# Patient Record
Sex: FEMALE | Race: WHITE | Employment: FULL TIME | URBAN - METROPOLITAN AREA
[De-identification: names, ages, dates, MRNs, and addresses within clinical notes are randomized per-mention and may not be internally consistent; named-entity substitution may affect disease eponyms.]

---

## 2020-04-15 PROBLEM — Z34.90 PRENATAL CARE, ANTEPARTUM: Status: ACTIVE | Noted: 2020-04-15

## 2020-06-08 PROBLEM — O43.199 MARGINAL INSERTION OF UMBILICAL CORD AFFECTING MANAGEMENT OF MOTHER: Status: ACTIVE | Noted: 2020-06-08

## 2020-10-14 PROBLEM — O43.193 PLACENTA SUCCENTURIATA IN THIRD TRIMESTER: Status: ACTIVE | Noted: 2020-10-14

## 2020-10-28 PROBLEM — O36.63X0 MACROSOMIA AFFECTING MANAGEMENT OF MOTHER IN THIRD TRIMESTER: Status: ACTIVE | Noted: 2020-10-28

## 2020-10-28 PROBLEM — R03.0 TRANSIENT HYPERTENSION: Status: ACTIVE | Noted: 2020-10-28

## 2020-10-30 PROBLEM — Z34.90 TERM PREGNANCY: Status: ACTIVE | Noted: 2020-10-30

## 2020-10-30 PROBLEM — O13.3 GESTATIONAL (PREGNANCY-INDUCED) HYPERTENSION WITHOUT SIGNIFICANT PROTEINURIA, THIRD TRIMESTER: Status: ACTIVE | Noted: 2020-10-30

## 2021-05-06 PROBLEM — O43.199 MARGINAL INSERTION OF UMBILICAL CORD AFFECTING MANAGEMENT OF MOTHER: Status: RESOLVED | Noted: 2020-06-08 | Resolved: 2021-05-06

## 2021-05-06 PROBLEM — Z34.90 PRENATAL CARE, ANTEPARTUM: Status: RESOLVED | Noted: 2020-04-15 | Resolved: 2021-05-06

## 2021-05-06 PROBLEM — O36.63X0 MACROSOMIA AFFECTING MANAGEMENT OF MOTHER IN THIRD TRIMESTER: Status: RESOLVED | Noted: 2020-10-28 | Resolved: 2021-05-06

## 2021-05-06 PROBLEM — Z34.90 TERM PREGNANCY: Status: RESOLVED | Noted: 2020-10-30 | Resolved: 2021-05-06

## 2021-05-06 PROBLEM — R03.0 TRANSIENT HYPERTENSION: Status: RESOLVED | Noted: 2020-10-28 | Resolved: 2021-05-06

## 2021-05-06 PROBLEM — O43.193 PLACENTA SUCCENTURIATA IN THIRD TRIMESTER: Status: RESOLVED | Noted: 2020-10-14 | Resolved: 2021-05-06

## 2021-05-06 PROBLEM — O13.3 GESTATIONAL (PREGNANCY-INDUCED) HYPERTENSION WITHOUT SIGNIFICANT PROTEINURIA, THIRD TRIMESTER: Status: RESOLVED | Noted: 2020-10-30 | Resolved: 2021-05-06

## 2022-02-18 ENCOUNTER — HOSPITAL ENCOUNTER (OUTPATIENT)
Age: 27
Discharge: HOME | End: 2022-02-18
Attending: OBSTETRICS & GYNECOLOGY | Admitting: OBSTETRICS & GYNECOLOGY
Payer: COMMERCIAL

## 2022-02-18 VITALS
BODY MASS INDEX: 36.96 KG/M2 | RESPIRATION RATE: 18 BRPM | HEIGHT: 66 IN | WEIGHT: 230 LBS | SYSTOLIC BLOOD PRESSURE: 129 MMHG | TEMPERATURE: 98.6 F | DIASTOLIC BLOOD PRESSURE: 74 MMHG | HEART RATE: 108 BPM
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
## 2022-02-18 LAB
AMPHETAMINE SCREEN, URINE: NOT DETECTED
BARBITURATE SCREEN URINE: NOT DETECTED
BENZODIAZEPINE SCREEN, URINE: NOT DETECTED
CANNABINOID SCREEN URINE: NOT DETECTED
COCAINE METABOLITE SCREEN URINE: NOT DETECTED
FENTANYL SCREEN, URINE: NOT DETECTED
Lab: NORMAL
METHADONE SCREEN, URINE: NOT DETECTED
OPIATE SCREEN URINE: NOT DETECTED
OXYCODONE URINE: NOT DETECTED
PHENCYCLIDINE SCREEN URINE: NOT DETECTED

## 2022-02-18 PROCEDURE — 80307 DRUG TEST PRSMV CHEM ANLYZR: CPT

## 2022-02-18 RX ORDER — ACETAMINOPHEN 325 MG/1
650 TABLET ORAL EVERY 6 HOURS PRN
Status: ON HOLD | COMMUNITY
End: 2022-04-10 | Stop reason: HOSPADM

## 2022-02-18 NOTE — PROGRESS NOTES
Patient received by ambulance with complaint of falling at work on ice and landing on hands and knees. States she has no discomfort at present time. No bruising or discolorations and no scratches noted on either hands or knees. Patient states she felt contractions after fall that stopped after voiding. EDC of 22, 31 weeks and 2 days . Joseph Cash   Placed on EFM

## 2022-02-18 NOTE — PROGRESS NOTES
Dr Marley Vincent called and notified that patient was comfortable and fetal tracing was reactive.   Ok to discharge at this time

## 2022-03-09 ENCOUNTER — HOSPITAL ENCOUNTER (OUTPATIENT)
Age: 27
Discharge: HOME | End: 2022-03-09
Payer: COMMERCIAL

## 2022-03-09 LAB
GLUCOSE TOLERANCE TEST 1 HOUR: 157 MG/DL
GLUCOSE TOLERANCE TEST 2 HOUR: 157 MG/DL
GLUCOSE TOLERANCE TEST FASTING: 91 MG/DL

## 2022-03-09 PROCEDURE — 82951 GLUCOSE TOLERANCE TEST (GTT): CPT

## 2022-03-09 PROCEDURE — 36415 COLL VENOUS BLD VENIPUNCTURE: CPT

## 2022-03-14 ENCOUNTER — APPOINTMENT (OUTPATIENT)
Dept: LABOR AND DELIVERY | Age: 27
End: 2022-03-14
Payer: COMMERCIAL

## 2022-03-14 ENCOUNTER — HOSPITAL ENCOUNTER (OUTPATIENT)
Age: 27
Discharge: HOME | End: 2022-03-14
Attending: OBSTETRICS & GYNECOLOGY | Admitting: OBSTETRICS & GYNECOLOGY
Payer: COMMERCIAL

## 2022-03-14 VITALS
HEART RATE: 114 BPM | TEMPERATURE: 98.6 F | SYSTOLIC BLOOD PRESSURE: 129 MMHG | RESPIRATION RATE: 18 BRPM | DIASTOLIC BLOOD PRESSURE: 67 MMHG

## 2022-03-14 PROCEDURE — 59025 FETAL NON-STRESS TEST: CPT

## 2022-03-15 NOTE — PROGRESS NOTES
Patient arrived ambulatory for NST due to GDM. Patient reports she was sent here for NST this evening after her appointment at Dr. Cisneros  office this afternnoon where she did not have active fetal movement. GA 34w5d, EDC 4-. Patient reports no pain/problems urinating, no leakage of vaginal fluids/vaginal bleeding, and decreased fetal movement. NST explained to patient. VS taken and EFM applied. At this time, after fetal monitors applied, patient does have positive active fetal movement, and fetal movement is audible.

## 2022-03-15 NOTE — PROGRESS NOTES
Telephone update given to Dr. Jamie Rodriguez, NST is reactive, and patient has perceived active fetal movement throughout the NST. Dr. Jamie Rodriguez said patient can go home, and no additional NSTs need to be scheduled at this time.

## 2022-03-16 ENCOUNTER — HOSPITAL ENCOUNTER (OUTPATIENT)
Dept: DIABETES SERVICES | Age: 27
Setting detail: THERAPIES SERIES
Discharge: HOME | End: 2022-03-16
Payer: COMMERCIAL

## 2022-03-16 VITALS — BODY MASS INDEX: 38.57 KG/M2 | WEIGHT: 240 LBS | HEIGHT: 66 IN

## 2022-03-16 PROCEDURE — G0108 DIAB MANAGE TRN  PER INDIV: HCPCS | Performed by: DIETITIAN, REGISTERED

## 2022-03-16 NOTE — PROGRESS NOTES
Diabetes Self-Management Education Record    Participant Name: Page Hammonds  Referring Provider: Nickie Valero DO  Assessment/Evaluation Ratings:  1=Needs Instruction   4=Demonstrates Understanding/Competency  2=Needs Review   NC=Not Covered    3=Comprehends Key Points  N/A=Not Applicable  Topics/Learning Objectives Pre-session Assess Date:  Instructor initials/date  3/16/2022-ALVARADO   Instr. Date    Instructor initials/date  3/16/2022-ALVARADO Follow-up Post- session Eval Comments   Diabetes disease process & Treatment process:   -Define type of diabetes in simple terms.  - Describe the ABCs of  diabetes management  -Identify own type of diabetes  -Identify lifestyle changes/treatment options  -other:  1 [x] All     []  []  []  []  []  3 3/16/2022  Third pregnancy, newly diagnosed GDM, 1 live birth   Developing strategies for Healthy coping/psychosocial issues:    -Describe feelings about living with diabetes  -Identify coping strategies and sources of stress  -Identify support needed & support network available  -Complete PAID-5 Diabetes questionnaire 1 [] All     [x]  [x]  [x]    []  3 3/16/2022  Boyfriend, mother supportive           Prevention, detection & treatment of Chronic complications:    -Identify the prevention, detection and treatment for complications including immunizations, preventive eye, foot, dental and renal exams as indicated per the participant's duration of diabetes and health status.  -Define the natural course of diabetes and the relationship of blood glucose levels to long term complications of diabetes.   1 [x] All     []            []  3 3/16/2022 Discussed risk of type 2 later in life and ways to prevent   Prevention, detection & treatment of acute complications:    -State the causes,signs & symptoms of hyper & hypoglycemia, and prevention & treatment strategies.   -Describe sick day guidelines  DKA /indications for ketone testing &  when to call physician  1 [x] All     []      []    3 3/16/2022 Discussed effects of hyperglycemia on baby         -Identify severe weather/situation crisis  & diabetes supplies management  []      Using medications safely:   -State effects of diabetes medicines on blood glucose levels;  -List diabetes medication taken, action & side effects N/A [] All     []  []      Insulin/Injectables/glucagon  -Name appropriate injection sites; proper storage; supplies needed;  N/A   []       Demonstrate proper technique  []      Monitoring blood glucose, interpreting and using results:   -Identify the purpose of testing   -Identify recommended & personal blood glucose targets & HgbA1C target levels  -State the Importance of logging blood glucose levels for pattern recognition;   -State benefits of reading/using pt generated health data  -Verbalize safe lancet disposal 1 [x] All     []  []    []  []  []  3  3/16/2022  Instructed patient on One Touch Ultra monitor, current reading 119 mg/dl , 2 hours after lunch. Pt able to monitor without difficulty. Provided blood sugar record sheet to take readings to physician appointments   -Demonstrate proper testing technique  []      Incorporating physical activity into lifestyle:   -State effect of exercise on blood glucose levels;   -State benefits of regular exercise;   -Define safety considerations/food choices if needed.  -Describe contraindications/maintenance of activity. 1 [x] All     []  []    []  []  3 3/16/2022  Pt active with daily activities   Incorporating nutritional management into lifestyle:   -Describe effect of type, amount & timing of food on blood glucose  -Describe methods for preparing and planning healthy meals  -Correctly read food labels  -Name 3 foods high in Carbohydrate 1 [x] All       []    []    []  []  3 3/16/2022  Pt states she eats larger portions, has been cutting back on regular sprite, drinks a lot of water.  Just moved to [facility], eating habits have been disrupted d/t the move, but getting back into a routine. Eating breakfast now. -Plan a carbohydrate-controlled meal based on individualized meal plan  -Demonstrate CHO counting/portion control  1 [x]  [x]  3 3/16/2022 Instructed on 2000 calorie meal plan, 2 carbohydrate choices at breakfast, 3 at lunch and dinner. 2 in between each meal and after dinner. 7 oz protein, 7 fat servings daily. Provided sample meals and snacks. Developing strategies for problem solving to promote health/change behavior. -Identify 7 self-care behaviors; Personal health risk factors; Benefits, challenges & strategies for behavioral change and set an individualized goal selection. 1       [x]  3 3/16/2022  [x]Nutrition  [x]Monitoring  []Exercise  []Medication  []Other     Identified Barriers to learning/adherence to self management plan:    None  []  other    Instruction Method:  Lecture/Discussion, Handouts and Return demonstration     Supporting Education Materials/Equipment Provided: Meal Plan, Nutritional Packet and Gestational Pathway Booklet   []Afghan materials       [] services     []Other:      Encounter Type Date Attended Start Time End Time Comments No Show Dates   Assessment          Session 1         Session 2        1:1 DSMES          In person Follow-up         Gestational Diabetes 3/16/2022-JA 14:00 15:00      Individual MNT        Meter Instrx        Insulin Instrx           Additional Comments: [] Pt seen individually due to Covid-19 Safety precautions and no group session available.         Date:   Follow-up goal attainment based on patients initial DSMES goal    Dr Notified by [] EMR []Fax        []Post class Hgb A1C  []Medication compliance   []Plate method/meal plan compliance   []Able to state the number of Carbohydrate servings eaten at B,L,D   []Testing blood glucose as prescribed by PCP   []Exercise Routine   []Other:   []Other:     []Patient lost to follow-up   Notified by []EMR []Fax     Personal Support Plan:      [] Keep all scheduled doctor appointments   [] Make and keep appointments with specialists (foot, eye, dentist) as recommended   [] Consult my pharmacist about all new medications or to ask any medication questions   [] Get tested for sleep apnea   [] Seek help for:   [] Make an appointment with:   [] Attend smoking cessation classes or call 1-800-QUIT-NOW  [] Attend Diabetes Support Group   [] Use diabetes magazines, books, or credible web-sites like the ADA for more information  [] Increase exercise at home or join an exercise program:   [] Other:

## 2022-03-16 NOTE — LETTER
[facility] Diabetes Education DSMES Follow-up Letter    Name: Sharon Deluna  :   1995    Follow-up plan/Date:   3/31/2022               [facility]     Dear Dr. Unice Leventhal:    Thank you for referring  Sharon Deluna  to [facility] Diabetes Education Services. Sharon Deluna  has completed their personalized comprehensive education plan. The education plan included the following topics: Gestational Diabetes Disease Process, Nutrition, Glucose Monitoring, and goal setting. We contact participants 2 weeks after attending our services to review their progress on their chosen goal.      The following area was chosen: [x] Healthy Eating   [x] Monitoring   Selected goal outcome Post Education:     Patient states they met their goal 100% of time. Thank you for referring this patient to our program. Please do not hesitate to call if we can be of any service for this patient.      Richard Grady [facility] [place] or [place]: Kyung: Jose Houser RN [phone] [facility] Diabetes Education Department  American Diabetes Association Recognized DSMES Program

## 2022-03-31 NOTE — PROGRESS NOTES
Diabetes Self-Management Education Record    Participant Name: Sadaf Mcgregor  Referring Provider: Sharmin Guan DO  Assessment/Evaluation Ratings:  1=Needs Instruction   4=Demonstrates Understanding/Competency  2=Needs Review   NC=Not Covered    3=Comprehends Key Points  N/A=Not Applicable  Topics/Learning Objectives Pre-session Assess Date:  Instructor initials/date  3/16/2022-ALVARADO   Instr. Date    Instructor initials/date  3/16/2022-ALVARADO Follow-up Post- session Eval Comments   Diabetes disease process & Treatment process:   -Define type of diabetes in simple terms.  - Describe the ABCs of  diabetes management  -Identify own type of diabetes  -Identify lifestyle changes/treatment options  -other:  1 [x] All     []  []  []  []  []  3 3/16/2022  Third pregnancy, newly diagnosed GDM, 1 live birth   Developing strategies for Healthy coping/psychosocial issues:    -Describe feelings about living with diabetes  -Identify coping strategies and sources of stress  -Identify support needed & support network available  -Complete PAID-5 Diabetes questionnaire 1 [] All     [x]  [x]  [x]    []  3 3/16/2022  Boyfriend, mother supportive           Prevention, detection & treatment of Chronic complications:    -Identify the prevention, detection and treatment for complications including immunizations, preventive eye, foot, dental and renal exams as indicated per the participant's duration of diabetes and health status.  -Define the natural course of diabetes and the relationship of blood glucose levels to long term complications of diabetes.   1 [x] All     []            []  3 3/16/2022 Discussed risk of type 2 later in life and ways to prevent   Prevention, detection & treatment of acute complications:    -State the causes,signs & symptoms of hyper & hypoglycemia, and prevention & treatment strategies.   -Describe sick day guidelines  DKA /indications for ketone testing &  when to call physician  1 [x] All     []      []    3 3/16/2022 Discussed effects of hyperglycemia on baby         -Identify severe weather/situation crisis  & diabetes supplies management  []      Using medications safely:   -State effects of diabetes medicines on blood glucose levels;  -List diabetes medication taken, action & side effects N/A [] All     []  []      Insulin/Injectables/glucagon  -Name appropriate injection sites; proper storage; supplies needed;  N/A   []       Demonstrate proper technique  []      Monitoring blood glucose, interpreting and using results:   -Identify the purpose of testing   -Identify recommended & personal blood glucose targets & HgbA1C target levels  -State the Importance of logging blood glucose levels for pattern recognition;   -State benefits of reading/using pt generated health data  -Verbalize safe lancet disposal 1 [x] All     []  []    []  []  []  3  3/16/2022  Instructed patient on One Touch Ultra monitor, current reading 119 mg/dl , 2 hours after lunch. Pt able to monitor without difficulty. Provided blood sugar record sheet to take readings to physician appointments   -Demonstrate proper testing technique  []      Incorporating physical activity into lifestyle:   -State effect of exercise on blood glucose levels;   -State benefits of regular exercise;   -Define safety considerations/food choices if needed.  -Describe contraindications/maintenance of activity. 1 [x] All     []  []    []  []  3 3/16/2022  Pt active with daily activities   Incorporating nutritional management into lifestyle:   -Describe effect of type, amount & timing of food on blood glucose  -Describe methods for preparing and planning healthy meals  -Correctly read food labels  -Name 3 foods high in Carbohydrate 1 [x] All       []    []    []  []  3 3/16/2022  Pt states she eats larger portions, has been cutting back on regular sprite, drinks a lot of water.  Just moved to [facility], eating habits have been disrupted d/t the move, but getting back into a routine. Eating breakfast now. -Plan a carbohydrate-controlled meal based on individualized meal plan  -Demonstrate CHO counting/portion control  1 [x]  [x]  3 3/16/2022 Instructed on 2000 calorie meal plan, 2 carbohydrate choices at breakfast, 3 at lunch and dinner. 2 in between each meal and after dinner. 7 oz protein, 7 fat servings daily. Provided sample meals and snacks. Developing strategies for problem solving to promote health/change behavior. -Identify 7 self-care behaviors; Personal health risk factors; Benefits, challenges & strategies for behavioral change and set an individualized goal selection. 1       [x]  3 3/16/2022  [x]Nutrition  [x]Monitoring  []Exercise  []Medication  []Other     Identified Barriers to learning/adherence to self management plan:    None  []  other    Instruction Method:  Lecture/Discussion, Handouts and Return demonstration     Supporting Education Materials/Equipment Provided: Meal Plan, Nutritional Packet and Gestational Pathway Booklet   []Thai materials       [] services     []Other:      Encounter Type Date Attended Start Time End Time Comments No Show Dates   Assessment          Session 1         Session 2        1:1 DSMES          In person Follow-up         Gestational Diabetes 3/16/2022-ALVARADO 14:00 15:00      Individual MNT        Meter Instrx        Insulin Instrx           Additional Comments: [] Pt seen individually due to Covid-19 Safety precautions and no group session available.         Date:  3/31/22  Follow-up goal attainment based on patients initial DSMES goal  Met  Notified by [x] EMR []Fax        []Post class Hgb A1C  []Medication compliance   []Plate method/meal plan compliance   [x]Able to state the number of Carbohydrate servings eaten at B,L,D   [x]Testing blood glucose as prescribed by PCP   []Exercise Routine   []Other:   []Other:     []Patient lost to follow-up   Notified by []EMR []Fax     Personal Support Plan:      [x] Keep all scheduled doctor appointments   [] Make and keep appointments with specialists (foot, eye, dentist) as recommended   [] Consult my pharmacist about all new medications or to ask any medication questions   [] Get tested for sleep apnea   [] Seek help for:   [] Make an appointment with:   [] Attend smoking cessation classes or call 1-800-QUIT-NOW  [] Attend Diabetes Support Group   [] Use diabetes magazines, books, or credible web-sites like the ADA for more information  [] Increase exercise at home or join an exercise program:   [] Other:

## 2022-04-06 ENCOUNTER — ANESTHESIA EVENT (OUTPATIENT)
Dept: LABOR AND DELIVERY | Age: 27
End: 2022-04-06
Payer: COMMERCIAL

## 2022-04-08 ENCOUNTER — ANESTHESIA (OUTPATIENT)
Dept: LABOR AND DELIVERY | Age: 27
End: 2022-04-08
Payer: COMMERCIAL

## 2022-04-08 ENCOUNTER — HOSPITAL ENCOUNTER (INPATIENT)
Age: 27
LOS: 3 days | Discharge: HOME | End: 2022-04-11
Attending: OBSTETRICS & GYNECOLOGY | Admitting: OBSTETRICS & GYNECOLOGY
Payer: COMMERCIAL

## 2022-04-08 VITALS — OXYGEN SATURATION: 100 % | SYSTOLIC BLOOD PRESSURE: 111 MMHG | DIASTOLIC BLOOD PRESSURE: 53 MMHG

## 2022-04-08 DIAGNOSIS — G89.18 POSTOPERATIVE PAIN: Primary | ICD-10-CM

## 2022-04-08 PROBLEM — O36.63X9: Status: ACTIVE | Noted: 2022-04-08

## 2022-04-08 LAB
ABO/RH: NORMAL
AMPHETAMINE SCREEN, URINE: NOT DETECTED
ANTIBODY SCREEN: NORMAL
BARBITURATE SCREEN URINE: NOT DETECTED
BENZODIAZEPINE SCREEN, URINE: NOT DETECTED
CANNABINOID SCREEN URINE: NOT DETECTED
COCAINE METABOLITE SCREEN URINE: NOT DETECTED
FENTANYL SCREEN, URINE: NOT DETECTED
HCT VFR BLD CALC: 36.1 % (ref 34–48)
HEMOGLOBIN: 10.9 G/DL (ref 11.5–15.5)
Lab: NORMAL
MCH RBC QN AUTO: 23.6 PG (ref 26–35)
MCHC RBC AUTO-ENTMCNC: 30.2 % (ref 32–34.5)
MCV RBC AUTO: 78.3 FL (ref 80–99.9)
METER GLUCOSE: 76 MG/DL (ref 74–99)
METHADONE SCREEN, URINE: NOT DETECTED
OPIATE SCREEN URINE: NOT DETECTED
OXYCODONE URINE: NOT DETECTED
PDW BLD-RTO: 15.6 FL (ref 11.5–15)
PHENCYCLIDINE SCREEN URINE: NOT DETECTED
PLATELET # BLD: 349 E9/L (ref 130–450)
PMV BLD AUTO: 10.8 FL (ref 7–12)
RBC # BLD: 4.61 E12/L (ref 3.5–5.5)
WBC # BLD: 14.1 E9/L (ref 4.5–11.5)

## 2022-04-08 PROCEDURE — 6360000002 HC RX W HCPCS

## 2022-04-08 PROCEDURE — 6360000002 HC RX W HCPCS: Performed by: OBSTETRICS & GYNECOLOGY

## 2022-04-08 PROCEDURE — 6360000002 HC RX W HCPCS: Performed by: ANESTHESIOLOGY

## 2022-04-08 PROCEDURE — 2500000003 HC RX 250 WO HCPCS

## 2022-04-08 PROCEDURE — 2709999900 HC NON-CHARGEABLE SUPPLY: Performed by: OBSTETRICS & GYNECOLOGY

## 2022-04-08 PROCEDURE — 2580000003 HC RX 258: Performed by: OBSTETRICS & GYNECOLOGY

## 2022-04-08 PROCEDURE — 82962 GLUCOSE BLOOD TEST: CPT

## 2022-04-08 PROCEDURE — 7100000001 HC PACU RECOVERY - ADDTL 15 MIN: Performed by: OBSTETRICS & GYNECOLOGY

## 2022-04-08 PROCEDURE — 3609079900 HC CESAREAN SECTION: Performed by: OBSTETRICS & GYNECOLOGY

## 2022-04-08 PROCEDURE — 3700000000 HC ANESTHESIA ATTENDED CARE: Performed by: OBSTETRICS & GYNECOLOGY

## 2022-04-08 PROCEDURE — 6370000000 HC RX 637 (ALT 250 FOR IP): Performed by: OBSTETRICS & GYNECOLOGY

## 2022-04-08 PROCEDURE — 7100000000 HC PACU RECOVERY - FIRST 15 MIN: Performed by: OBSTETRICS & GYNECOLOGY

## 2022-04-08 PROCEDURE — 36415 COLL VENOUS BLD VENIPUNCTURE: CPT

## 2022-04-08 PROCEDURE — 86900 BLOOD TYPING SEROLOGIC ABO: CPT

## 2022-04-08 PROCEDURE — 3700000001 HC ADD 15 MINUTES (ANESTHESIA): Performed by: OBSTETRICS & GYNECOLOGY

## 2022-04-08 PROCEDURE — 80307 DRUG TEST PRSMV CHEM ANLYZR: CPT

## 2022-04-08 PROCEDURE — 86850 RBC ANTIBODY SCREEN: CPT

## 2022-04-08 PROCEDURE — 1220000000 HC SEMI PRIVATE OB R&B

## 2022-04-08 PROCEDURE — 85027 COMPLETE CBC AUTOMATED: CPT

## 2022-04-08 PROCEDURE — 86901 BLOOD TYPING SEROLOGIC RH(D): CPT

## 2022-04-08 RX ORDER — MODIFIED LANOLIN
OINTMENT (GRAM) TOPICAL
Status: DISCONTINUED | OUTPATIENT
Start: 2022-04-08 | End: 2022-04-11 | Stop reason: HOSPADM

## 2022-04-08 RX ORDER — BUPIVACAINE HYDROCHLORIDE 7.5 MG/ML
INJECTION, SOLUTION INTRASPINAL PRN
Status: DISCONTINUED | OUTPATIENT
Start: 2022-04-08 | End: 2022-04-08 | Stop reason: SDUPTHER

## 2022-04-08 RX ORDER — PHENYLEPHRINE HCL IN 0.9% NACL 1 MG/10 ML
SYRINGE (ML) INTRAVENOUS PRN
Status: DISCONTINUED | OUTPATIENT
Start: 2022-04-08 | End: 2022-04-08 | Stop reason: SDUPTHER

## 2022-04-08 RX ORDER — SIMETHICONE 80 MG
80 TABLET,CHEWABLE ORAL EVERY 6 HOURS PRN
Status: DISCONTINUED | OUTPATIENT
Start: 2022-04-08 | End: 2022-04-11 | Stop reason: HOSPADM

## 2022-04-08 RX ORDER — MORPHINE SULFATE 1 MG/ML
INJECTION, SOLUTION EPIDURAL; INTRATHECAL; INTRAVENOUS PRN
Status: DISCONTINUED | OUTPATIENT
Start: 2022-04-08 | End: 2022-04-08 | Stop reason: SDUPTHER

## 2022-04-08 RX ORDER — SODIUM CHLORIDE 9 MG/ML
INJECTION, SOLUTION INTRAVENOUS PRN
Status: DISCONTINUED | OUTPATIENT
Start: 2022-04-08 | End: 2022-04-08 | Stop reason: SDUPTHER

## 2022-04-08 RX ORDER — HYDROCODONE BITARTRATE AND ACETAMINOPHEN 5; 325 MG/1; MG/1
1 TABLET ORAL EVERY 4 HOURS PRN
Status: DISPENSED | OUTPATIENT
Start: 2022-04-08 | End: 2022-04-09

## 2022-04-08 RX ORDER — OXYCODONE HYDROCHLORIDE AND ACETAMINOPHEN 5; 325 MG/1; MG/1
2 TABLET ORAL EVERY 4 HOURS PRN
Status: DISCONTINUED | OUTPATIENT
Start: 2022-04-08 | End: 2022-04-11 | Stop reason: HOSPADM

## 2022-04-08 RX ORDER — SODIUM CHLORIDE 0.9 % (FLUSH) 0.9 %
10 SYRINGE (ML) INJECTION PRN
Status: DISCONTINUED | OUTPATIENT
Start: 2022-04-08 | End: 2022-04-08 | Stop reason: SDUPTHER

## 2022-04-08 RX ORDER — DIPHENHYDRAMINE HYDROCHLORIDE 50 MG/ML
25 INJECTION INTRAMUSCULAR; INTRAVENOUS EVERY 6 HOURS PRN
Status: ACTIVE | OUTPATIENT
Start: 2022-04-08 | End: 2022-04-09

## 2022-04-08 RX ORDER — NALBUPHINE HCL 10 MG/ML
5 AMPUL (ML) INJECTION EVERY 4 HOURS PRN
Status: DISPENSED | OUTPATIENT
Start: 2022-04-08 | End: 2022-04-09

## 2022-04-08 RX ORDER — SODIUM CHLORIDE, SODIUM LACTATE, POTASSIUM CHLORIDE, AND CALCIUM CHLORIDE .6; .31; .03; .02 G/100ML; G/100ML; G/100ML; G/100ML
1000 INJECTION, SOLUTION INTRAVENOUS ONCE
Status: COMPLETED | OUTPATIENT
Start: 2022-04-08 | End: 2022-04-08

## 2022-04-08 RX ORDER — BISACODYL 10 MG
10 SUPPOSITORY, RECTAL RECTAL DAILY PRN
Status: DISCONTINUED | OUTPATIENT
Start: 2022-04-08 | End: 2022-04-11 | Stop reason: HOSPADM

## 2022-04-08 RX ORDER — ONDANSETRON 2 MG/ML
4 INJECTION INTRAMUSCULAR; INTRAVENOUS EVERY 6 HOURS PRN
Status: DISCONTINUED | OUTPATIENT
Start: 2022-04-08 | End: 2022-04-11 | Stop reason: HOSPADM

## 2022-04-08 RX ORDER — KETOROLAC TROMETHAMINE 30 MG/ML
30 INJECTION, SOLUTION INTRAMUSCULAR; INTRAVENOUS EVERY 6 HOURS
Status: COMPLETED | OUTPATIENT
Start: 2022-04-08 | End: 2022-04-09

## 2022-04-08 RX ORDER — SODIUM CHLORIDE 9 MG/ML
25 INJECTION, SOLUTION INTRAVENOUS PRN
Status: DISCONTINUED | OUTPATIENT
Start: 2022-04-08 | End: 2022-04-11 | Stop reason: HOSPADM

## 2022-04-08 RX ORDER — SODIUM CHLORIDE, SODIUM LACTATE, POTASSIUM CHLORIDE, CALCIUM CHLORIDE 600; 310; 30; 20 MG/100ML; MG/100ML; MG/100ML; MG/100ML
INJECTION, SOLUTION INTRAVENOUS CONTINUOUS
Status: DISCONTINUED | OUTPATIENT
Start: 2022-04-08 | End: 2022-04-08 | Stop reason: SDUPTHER

## 2022-04-08 RX ORDER — IBUPROFEN 800 MG/1
800 TABLET ORAL EVERY 8 HOURS
Status: DISCONTINUED | OUTPATIENT
Start: 2022-04-09 | End: 2022-04-11 | Stop reason: HOSPADM

## 2022-04-08 RX ORDER — SODIUM CHLORIDE 0.9 % (FLUSH) 0.9 %
10 SYRINGE (ML) INJECTION EVERY 12 HOURS SCHEDULED
Status: DISCONTINUED | OUTPATIENT
Start: 2022-04-08 | End: 2022-04-11 | Stop reason: HOSPADM

## 2022-04-08 RX ORDER — SODIUM CHLORIDE, SODIUM LACTATE, POTASSIUM CHLORIDE, CALCIUM CHLORIDE 600; 310; 30; 20 MG/100ML; MG/100ML; MG/100ML; MG/100ML
INJECTION, SOLUTION INTRAVENOUS CONTINUOUS
Status: DISCONTINUED | OUTPATIENT
Start: 2022-04-08 | End: 2022-04-11 | Stop reason: HOSPADM

## 2022-04-08 RX ORDER — ONDANSETRON 2 MG/ML
INJECTION INTRAMUSCULAR; INTRAVENOUS PRN
Status: DISCONTINUED | OUTPATIENT
Start: 2022-04-08 | End: 2022-04-08 | Stop reason: SDUPTHER

## 2022-04-08 RX ORDER — TRISODIUM CITRATE DIHYDRATE AND CITRIC ACID MONOHYDRATE 500; 334 MG/5ML; MG/5ML
30 SOLUTION ORAL ONCE
Status: COMPLETED | OUTPATIENT
Start: 2022-04-08 | End: 2022-04-08

## 2022-04-08 RX ORDER — SODIUM CHLORIDE 0.9 % (FLUSH) 0.9 %
10 SYRINGE (ML) INJECTION PRN
Status: DISCONTINUED | OUTPATIENT
Start: 2022-04-08 | End: 2022-04-11 | Stop reason: HOSPADM

## 2022-04-08 RX ORDER — METHYLERGONOVINE MALEATE 0.2 MG/ML
200 INJECTION INTRAVENOUS PRN
Status: DISCONTINUED | OUTPATIENT
Start: 2022-04-08 | End: 2022-04-11 | Stop reason: HOSPADM

## 2022-04-08 RX ORDER — OXYCODONE HYDROCHLORIDE AND ACETAMINOPHEN 5; 325 MG/1; MG/1
1 TABLET ORAL EVERY 4 HOURS PRN
Status: DISCONTINUED | OUTPATIENT
Start: 2022-04-08 | End: 2022-04-11 | Stop reason: HOSPADM

## 2022-04-08 RX ORDER — FERROUS SULFATE 325(65) MG
325 TABLET ORAL 2 TIMES DAILY WITH MEALS
Status: DISCONTINUED | OUTPATIENT
Start: 2022-04-09 | End: 2022-04-11 | Stop reason: HOSPADM

## 2022-04-08 RX ORDER — SODIUM CHLORIDE 0.9 % (FLUSH) 0.9 %
10 SYRINGE (ML) INJECTION EVERY 12 HOURS SCHEDULED
Status: DISCONTINUED | OUTPATIENT
Start: 2022-04-08 | End: 2022-04-08 | Stop reason: SDUPTHER

## 2022-04-08 RX ORDER — NALOXONE HYDROCHLORIDE 0.4 MG/ML
0.4 INJECTION, SOLUTION INTRAMUSCULAR; INTRAVENOUS; SUBCUTANEOUS PRN
Status: DISCONTINUED | OUTPATIENT
Start: 2022-04-08 | End: 2022-04-11 | Stop reason: HOSPADM

## 2022-04-08 RX ORDER — LIDOCAINE HYDROCHLORIDE 10 MG/ML
INJECTION, SOLUTION INFILTRATION; PERINEURAL PRN
Status: DISCONTINUED | OUTPATIENT
Start: 2022-04-08 | End: 2022-04-08 | Stop reason: SDUPTHER

## 2022-04-08 RX ORDER — DOCUSATE SODIUM 100 MG/1
100 CAPSULE, LIQUID FILLED ORAL 2 TIMES DAILY
Status: DISCONTINUED | OUTPATIENT
Start: 2022-04-08 | End: 2022-04-11 | Stop reason: HOSPADM

## 2022-04-08 RX ADMIN — Medication 909 ML/HR: at 19:53

## 2022-04-08 RX ADMIN — Medication 100 MCG: at 19:56

## 2022-04-08 RX ADMIN — SODIUM CHLORIDE, POTASSIUM CHLORIDE, SODIUM LACTATE AND CALCIUM CHLORIDE 1000 ML: 600; 310; 30; 20 INJECTION, SOLUTION INTRAVENOUS at 18:02

## 2022-04-08 RX ADMIN — Medication 100 MCG: at 20:03

## 2022-04-08 RX ADMIN — BUPIVACAINE HYDROCHLORIDE 1.6 ML: 7.5 INJECTION, SOLUTION SUBARACHNOID at 19:36

## 2022-04-08 RX ADMIN — Medication 2000 MG: at 19:17

## 2022-04-08 RX ADMIN — Medication 100 MCG: at 20:16

## 2022-04-08 RX ADMIN — SODIUM CHLORIDE, POTASSIUM CHLORIDE, SODIUM LACTATE AND CALCIUM CHLORIDE 1000 ML: 600; 310; 30; 20 INJECTION, SOLUTION INTRAVENOUS at 17:15

## 2022-04-08 RX ADMIN — Medication 100 MCG: at 19:48

## 2022-04-08 RX ADMIN — SODIUM CHLORIDE, POTASSIUM CHLORIDE, SODIUM LACTATE AND CALCIUM CHLORIDE: 600; 310; 30; 20 INJECTION, SOLUTION INTRAVENOUS at 19:11

## 2022-04-08 RX ADMIN — Medication 100 MCG: at 20:05

## 2022-04-08 RX ADMIN — LIDOCAINE HYDROCHLORIDE 2 ML: 10 INJECTION, SOLUTION INFILTRATION; PERINEURAL at 19:29

## 2022-04-08 RX ADMIN — Medication 200 MCG: at 20:18

## 2022-04-08 RX ADMIN — ONDANSETRON 4 MG: 2 INJECTION INTRAMUSCULAR; INTRAVENOUS at 19:53

## 2022-04-08 RX ADMIN — Medication 100 MCG: at 19:59

## 2022-04-08 RX ADMIN — Medication 100 MCG: at 20:00

## 2022-04-08 RX ADMIN — MORPHINE SULFATE 0.15 MG: 1 INJECTION, SOLUTION EPIDURAL; INTRATHECAL; INTRAVENOUS at 19:36

## 2022-04-08 RX ADMIN — Medication 100 MCG: at 19:41

## 2022-04-08 RX ADMIN — Medication 100 MCG: at 19:45

## 2022-04-08 RX ADMIN — Medication 100 MCG: at 19:50

## 2022-04-08 RX ADMIN — SODIUM CITRATE AND CITRIC ACID MONOHYDRATE 30 ML: 500; 334 SOLUTION ORAL at 19:16

## 2022-04-08 RX ADMIN — KETOROLAC TROMETHAMINE 30 MG: 30 INJECTION, SOLUTION INTRAMUSCULAR at 22:26

## 2022-04-08 RX ADMIN — Medication 100 MCG: at 20:17

## 2022-04-08 ASSESSMENT — PULMONARY FUNCTION TESTS
PIF_VALUE: 1
PIF_VALUE: 0
PIF_VALUE: 1
PIF_VALUE: 0
PIF_VALUE: 1
PIF_VALUE: 0
PIF_VALUE: 1
PIF_VALUE: 0
PIF_VALUE: 1
PIF_VALUE: 0
PIF_VALUE: 1
PIF_VALUE: 0
PIF_VALUE: 1
PIF_VALUE: 1
PIF_VALUE: 0
PIF_VALUE: 0
PIF_VALUE: 1
PIF_VALUE: 1
PIF_VALUE: 0
PIF_VALUE: 1
PIF_VALUE: 0
PIF_VALUE: 0
PIF_VALUE: 1
PIF_VALUE: 0
PIF_VALUE: 1
PIF_VALUE: 0
PIF_VALUE: 1
PIF_VALUE: 0
PIF_VALUE: 0
PIF_VALUE: 1
PIF_VALUE: 0
PIF_VALUE: 0
PIF_VALUE: 1

## 2022-04-08 ASSESSMENT — PAIN SCALES - GENERAL
PAINLEVEL_OUTOF10: 0
PAINLEVEL_OUTOF10: 0

## 2022-04-08 ASSESSMENT — LIFESTYLE VARIABLES: SMOKING_STATUS: 0

## 2022-04-08 NOTE — ANESTHESIA PROCEDURE NOTES
Spinal Block    Patient location during procedure: OR  Start time: 4/8/2022 7:28 PM  End time: 4/8/2022 7:39 PM  Reason for block: primary anesthetic  Staffing  Performed: other anesthesia staff   Anesthesiologist: Claudine Cruz MD  Resident/CRNA: DARRIUS Soria - AUBRIE  Other anesthesia staff: Dulce Montague RN  Preanesthetic Checklist  Completed: patient identified, IV checked, site marked, risks and benefits discussed, surgical consent, monitors and equipment checked, pre-op evaluation, timeout performed, anesthesia consent given, oxygen available and patient being monitored  Spinal Block  Patient position: sitting  Prep: ChloraPrep  Patient monitoring: continuous pulse ox and frequent blood pressure checks  Approach: midline  Location: L3/L4  Provider prep: mask and sterile gloves  Local infiltration: lidocaine  Agent: bupivacaine  Adjuvant: duramorph  Dose: 1.6  Dose: 1.6  Needle  Needle type: Pencan   Needle gauge: 25 G  Needle length: 3.5 in  Assessment  Sensory level: T4  Swirl obtained: Yes  CSF: clear  Attempts: 1  Hemodynamics: stable
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
## 2022-04-08 NOTE — PROGRESS NOTES
at 38w2d presents for scheduled LTCS for macrosomia. Pt denies LOF, VB. Has had some contractions but not feeling them much. States +FM. Pt states she has diet controlled gestational diabetes and polyhydramnios. Patient placed on EFM. Call light within reach.

## 2022-04-08 NOTE — PROGRESS NOTES
Patient called requesting a refill on tessalon perles. She had a script previously from Dr. Celsa Valerio. Script pended if appropriate. Report given to [facility]

## 2022-04-09 LAB
HCT VFR BLD CALC: 23.9 % (ref 34–48)
HEMOGLOBIN: 7.4 G/DL (ref 11.5–15.5)
MCH RBC QN AUTO: 23.9 PG (ref 26–35)
MCHC RBC AUTO-ENTMCNC: 31 % (ref 32–34.5)
MCV RBC AUTO: 77.1 FL (ref 80–99.9)
PDW BLD-RTO: 15.3 FL (ref 11.5–15)
PLATELET # BLD: 263 E9/L (ref 130–450)
PMV BLD AUTO: 10.9 FL (ref 7–12)
RBC # BLD: 3.1 E12/L (ref 3.5–5.5)
WBC # BLD: 12.4 E9/L (ref 4.5–11.5)

## 2022-04-09 PROCEDURE — 2580000003 HC RX 258: Performed by: OBSTETRICS & GYNECOLOGY

## 2022-04-09 PROCEDURE — 6370000000 HC RX 637 (ALT 250 FOR IP): Performed by: ANESTHESIOLOGY

## 2022-04-09 PROCEDURE — 6360000002 HC RX W HCPCS: Performed by: OBSTETRICS & GYNECOLOGY

## 2022-04-09 PROCEDURE — 6370000000 HC RX 637 (ALT 250 FOR IP): Performed by: OBSTETRICS & GYNECOLOGY

## 2022-04-09 PROCEDURE — 6360000002 HC RX W HCPCS: Performed by: ANESTHESIOLOGY

## 2022-04-09 PROCEDURE — 85027 COMPLETE CBC AUTOMATED: CPT

## 2022-04-09 PROCEDURE — 1220000000 HC SEMI PRIVATE OB R&B

## 2022-04-09 PROCEDURE — 36415 COLL VENOUS BLD VENIPUNCTURE: CPT

## 2022-04-09 PROCEDURE — 59514 CESAREAN DELIVERY ONLY: CPT | Performed by: NURSE PRACTITIONER

## 2022-04-09 RX ADMIN — OXYCODONE AND ACETAMINOPHEN 1 TABLET: 5; 325 TABLET ORAL at 21:17

## 2022-04-09 RX ADMIN — HYDROCODONE BITARTRATE AND ACETAMINOPHEN 1 TABLET: 5; 325 TABLET ORAL at 16:25

## 2022-04-09 RX ADMIN — ENOXAPARIN SODIUM 40 MG: 100 INJECTION SUBCUTANEOUS at 09:00

## 2022-04-09 RX ADMIN — SIMETHICONE 80 MG: 80 TABLET, CHEWABLE ORAL at 17:18

## 2022-04-09 RX ADMIN — NALBUPHINE HYDROCHLORIDE 5 MG: 10 INJECTION, SOLUTION INTRAMUSCULAR; INTRAVENOUS; SUBCUTANEOUS at 00:17

## 2022-04-09 RX ADMIN — SIMETHICONE 80 MG: 80 TABLET, CHEWABLE ORAL at 09:00

## 2022-04-09 RX ADMIN — SODIUM CHLORIDE, PRESERVATIVE FREE 10 ML: 5 INJECTION INTRAVENOUS at 11:02

## 2022-04-09 RX ADMIN — KETOROLAC TROMETHAMINE 30 MG: 30 INJECTION, SOLUTION INTRAMUSCULAR at 05:00

## 2022-04-09 RX ADMIN — KETOROLAC TROMETHAMINE 30 MG: 30 INJECTION, SOLUTION INTRAMUSCULAR at 17:17

## 2022-04-09 RX ADMIN — FERROUS SULFATE TAB 325 MG (65 MG ELEMENTAL FE) 325 MG: 325 (65 FE) TAB at 17:18

## 2022-04-09 RX ADMIN — SODIUM CHLORIDE, PRESERVATIVE FREE 10 ML: 5 INJECTION INTRAVENOUS at 05:01

## 2022-04-09 RX ADMIN — DOCUSATE SODIUM 100 MG: 100 CAPSULE, LIQUID FILLED ORAL at 09:00

## 2022-04-09 RX ADMIN — SODIUM CHLORIDE, PRESERVATIVE FREE 10 ML: 5 INJECTION INTRAVENOUS at 17:17

## 2022-04-09 RX ADMIN — DOCUSATE SODIUM 100 MG: 100 CAPSULE, LIQUID FILLED ORAL at 21:17

## 2022-04-09 RX ADMIN — KETOROLAC TROMETHAMINE 30 MG: 30 INJECTION, SOLUTION INTRAMUSCULAR at 11:02

## 2022-04-09 RX ADMIN — FERROUS SULFATE TAB 325 MG (65 MG ELEMENTAL FE) 325 MG: 325 (65 FE) TAB at 09:00

## 2022-04-09 ASSESSMENT — PAIN SCALES - GENERAL
PAINLEVEL_OUTOF10: 6
PAINLEVEL_OUTOF10: 0
PAINLEVEL_OUTOF10: 3
PAINLEVEL_OUTOF10: 5
PAINLEVEL_OUTOF10: 6
PAINLEVEL_OUTOF10: 3

## 2022-04-09 NOTE — PROGRESS NOTES
Primary low transverse  section - (suspected macrosomia) for 1 viable male child 2022 @ 1952, manual removal of placenta at [address] [address]. Gestational diabetic diet controlled. APGARS .

## 2022-04-09 NOTE — OP NOTE
Operative Note      Patient: Rogelio Eller  YOB: 1995  MRN: [id number]    Date of Procedure: 2022    Pre-Op Diagnosis: Gestational DM with suspected fetal macrosomia and polyhydramnios    Post-Op Diagnosis: Same       Procedure(s):   SECTION, elective primary low transverse    Surgeon(s):  Litzy Jasmine MD    Assistant:   Sarbjit Edwards CNM    Anesthesia: Spinal    Estimated Blood Loss (mL): 8825    Complications: None    Detailed Description of Procedure: The patient was taken to the operating room where spinal anesthesia was started. It was tested and was found to be adequate. The patient was then prepared and draped in the normal sterile fashion in the dorsal supine position with a leftward tilt. A Pfannenstiel skin incision was then made with a scalpel and carried through to the underlying layer of fascia with the Bovie. The fascia was incised in the midline, and the incision was extended laterally with Aquino scissors. The superior aspect of the fascial incision was then grasped with Kocher clamps, elevated, and the underlying recti muscles dissected off bluntly. Attention was then turned to the inferior aspect of this incision which in a similar fashion was grasped, tented up with Kocher clamps, and the recti muscles dissected off bluntly. The recti muscles were then  in the midline, and the peritoneum was identified, tented up, and entered sharply with Metzenbaum scissors. The peritoneal incision was then extended superiorly and inferiorly with good visualization of the bladder. A Mobius retractor was inserted and the vesicouterine peritoneum was identified, grasped with pickups, and entered sharply with Metzenbaum scissors. This incision was then extended laterally, and the bladder flap was created digitally. The lower uterine segment was incised in a transverse fashion with a scalpel. The uterine incision was then extended laterally with bandage scissors.  The infant's head was delivered atraumatically. The nose and mouth were suctioned with a bulb syringe, and the cord was clamped and divided. The infant was then handed off to the awaiting pediatricians. The placenta was then removed by cord retraction. The uterine incision was then repaired with #0 Vicryl in a running locked fashion. A second layer of the same suture was used to obtain excellent hemostasis. Further hemostasis was secured from all bleeding points of the uterine incision using a series of figure-of-eight stitches with #0 Vicryl. After checking for complete hemostasis, the uterus was returned to the abdomen. The gutters were cleared of all clots. The retractor was then removed. The abdominal incision was then closed by approximating the fascia with # 0 Stratafix in a running fashion. Hemostasis was secured from all bleeding subcutaneous points with the Bovie. The subcutaneous fat layer was then approximated with 2-0 chromic catgut in a running stitch. The skin incision was finally closed with absorbable subcuticular staples. The patient tolerated the procedure well. Sponge, lap, and needle counts were correct x2. The patient was given 3 g of Ancef preop. The patient was finally taken to the recovery room in a stable condition. OUTCOME: The outcome was a live baby boy weighing 8 lb 14 oz. Apgar scores were 9 & 9 at 1 & 5 minutes respectively.       Electronically signed by Zelda Espino MD on 2022 at 8:29 PM

## 2022-04-09 NOTE — PROGRESS NOTES
Asked to assist . I assisted with opening the patient, tissue retraction, delivery of the baby, manipulation of suture, and closing of the patient. I was present for the entire case.   Dayanara Ramos, APRN - CNP

## 2022-04-09 NOTE — ANESTHESIA POSTPROCEDURE EVALUATION
Department of Anesthesiology  Postprocedure Note    Patient: Vishnu Suresh  MRN: [id number]  YOB: 1995  Date of evaluation: 2022  Time:  7:12 AM     Procedure Summary     Date: 22 Room / Location: [facility]  [address] [place] [address]    Anesthesia Start:  Anesthesia Stop:     Procedure:  SECTION (N/A Uterus) Diagnosis: (csection)    Surgeons: Luke Esteban MD Responsible Provider: Maranda Charles MD    Anesthesia Type: general, spinal ASA Status: 3          Anesthesia Type: general, spinal    Neal Phase I: Neal Score: 10    Neal Phase II:      Last vitals: Reviewed and per EMR flowsheets.        Anesthesia Post Evaluation    Patient location during evaluation: bedside  Patient participation: complete - patient participated  Level of consciousness: awake and alert  Pain score: 1  Airway patency: patent  Nausea & Vomiting: no nausea and no vomiting  Complications: no  Cardiovascular status: blood pressure returned to baseline  Respiratory status: acceptable  Hydration status: euvolemic

## 2022-04-09 NOTE — LACTATION NOTE
Discussed with mom her prior breastfeeding experience. She exclusively pumped breast milk for three months due to latch problems. Mom stated this baby latched once last night and now he is sleepy. Implemented waking techniques and taught mom hand expression. Offered baby several drops of colostrum nipple to mouth and baby successfully latched and sustained a suck for ten minutes before I left the room. Discussed frequency and duration of feedings. Encouraged mom to call me with breastfeeding questions, concerns or for assistance.

## 2022-04-09 NOTE — PROGRESS NOTES
[employer] [place] [employer] screening results were discussed with parent. Questions answered. Brochure given to parent. Advised to monitor developmental milestones and contact physician for any concerns.    Nyasia Martell

## 2022-04-10 PROCEDURE — 1220000000 HC SEMI PRIVATE OB R&B

## 2022-04-10 PROCEDURE — 6370000000 HC RX 637 (ALT 250 FOR IP): Performed by: OBSTETRICS & GYNECOLOGY

## 2022-04-10 PROCEDURE — 6360000002 HC RX W HCPCS: Performed by: OBSTETRICS & GYNECOLOGY

## 2022-04-10 RX ORDER — OXYCODONE HYDROCHLORIDE AND ACETAMINOPHEN 5; 325 MG/1; MG/1
1 TABLET ORAL EVERY 6 HOURS PRN
Qty: 10 TABLET | Refills: 0 | Status: SHIPPED | OUTPATIENT
Start: 2022-04-10 | End: 2022-04-13

## 2022-04-10 RX ORDER — IBUPROFEN 800 MG/1
800 TABLET ORAL EVERY 8 HOURS PRN
Qty: 20 TABLET | Refills: 1 | Status: SHIPPED | OUTPATIENT
Start: 2022-04-10 | End: 2022-07-05 | Stop reason: ALTCHOICE

## 2022-04-10 RX ORDER — FERROUS SULFATE 325(65) MG
325 TABLET ORAL
Qty: 30 TABLET | Refills: 3 | Status: SHIPPED | OUTPATIENT
Start: 2022-04-10 | End: 2022-07-05 | Stop reason: ALTCHOICE

## 2022-04-10 RX ADMIN — IBUPROFEN 800 MG: 800 TABLET, FILM COATED ORAL at 16:37

## 2022-04-10 RX ADMIN — DOCUSATE SODIUM 100 MG: 100 CAPSULE, LIQUID FILLED ORAL at 08:03

## 2022-04-10 RX ADMIN — OXYCODONE AND ACETAMINOPHEN 1 TABLET: 5; 325 TABLET ORAL at 21:12

## 2022-04-10 RX ADMIN — OXYCODONE AND ACETAMINOPHEN 1 TABLET: 5; 325 TABLET ORAL at 08:02

## 2022-04-10 RX ADMIN — ENOXAPARIN SODIUM 40 MG: 100 INJECTION SUBCUTANEOUS at 08:03

## 2022-04-10 RX ADMIN — SIMETHICONE 80 MG: 80 TABLET, CHEWABLE ORAL at 21:12

## 2022-04-10 RX ADMIN — Medication: at 00:05

## 2022-04-10 RX ADMIN — OXYCODONE AND ACETAMINOPHEN 1 TABLET: 5; 325 TABLET ORAL at 14:33

## 2022-04-10 RX ADMIN — FERROUS SULFATE TAB 325 MG (65 MG ELEMENTAL FE) 325 MG: 325 (65 FE) TAB at 08:03

## 2022-04-10 RX ADMIN — SIMETHICONE 80 MG: 80 TABLET, CHEWABLE ORAL at 08:03

## 2022-04-10 RX ADMIN — OXYCODONE AND ACETAMINOPHEN 1 TABLET: 5; 325 TABLET ORAL at 02:24

## 2022-04-10 RX ADMIN — IBUPROFEN 800 MG: 800 TABLET, FILM COATED ORAL at 08:02

## 2022-04-10 RX ADMIN — DOCUSATE SODIUM 100 MG: 100 CAPSULE, LIQUID FILLED ORAL at 21:13

## 2022-04-10 RX ADMIN — FERROUS SULFATE TAB 325 MG (65 MG ELEMENTAL FE) 325 MG: 325 (65 FE) TAB at 18:00

## 2022-04-10 RX ADMIN — IBUPROFEN 800 MG: 800 TABLET, FILM COATED ORAL at 00:05

## 2022-04-10 ASSESSMENT — PAIN - FUNCTIONAL ASSESSMENT: PAIN_FUNCTIONAL_ASSESSMENT: ACTIVITIES ARE NOT PREVENTED

## 2022-04-10 ASSESSMENT — PAIN SCALES - GENERAL
PAINLEVEL_OUTOF10: 6
PAINLEVEL_OUTOF10: 9
PAINLEVEL_OUTOF10: 6
PAINLEVEL_OUTOF10: 4
PAINLEVEL_OUTOF10: 0
PAINLEVEL_OUTOF10: 6
PAINLEVEL_OUTOF10: 6

## 2022-04-10 ASSESSMENT — PAIN DESCRIPTION - PROGRESSION: CLINICAL_PROGRESSION: GRADUALLY IMPROVING

## 2022-04-10 NOTE — LACTATION NOTE
Encouraged mom to use the hand held breast pump so baby can be supplemented with her own breast milk. Mom admits to a sore spot on the nipple with a trace of blood. Encouraged mom to use her breast milk and then lanolin to help heal.  Also made sure that flange size large enough on her hand held breast pump.

## 2022-04-10 NOTE — PROGRESS NOTES
Subjective:     Postpartum Day 2:  Delivery    The patient feels well. The patient denies emotional concerns. Pain is well controlled with current medications. The baby iswell. Baby is feeding via breast . Urinary output is adequate. The patient is ambulating well. The patient is tolerating a normal diet. Flatus has been passed. Objective:       Vitals:    22 2257   BP: 129/61   Pulse: 99   Resp: 18   Temp: 97.8 °F (36.6 °C)   SpO2: 100%         General:    alert, cooperative and no distress   Bowel Sounds:  active   Lochia:  appropriate   Uterine Fundus:   firm   Incision:  no significant drainage, no dehiscence   DVT Evaluation:  No evidence of DVT seen on physical exam.     CBC   Lab Results   Component Value Date    WBC 12.4 (H) 2022    HGB 7.4 (L) 2022    HCT 23.9 (L) 2022    MCV 77.1 (L) 2022     2022        Assessment:     Status post  section. Doing well postoperatively.      Plan:     Discharge home with standard precautions and return to the office in 1 week

## 2022-04-10 NOTE — PROGRESS NOTES
Subjective:     Postpartum Day 1:  Delivery    The patient feels well. The patient denies emotional concerns. Pain is well controlled with current medications. The baby iswell. Baby is feeding via breast . Urinary output is adequate. The patient is ambulating well. The patient is tolerating a normal diet. Flatus has been passed. Objective:        Vitals:    22 2257   BP: 129/61   Pulse: 99   Resp: 18   Temp: 97.8 °F (36.6 °C)   SpO2: 100%         General:    alert, cooperative and no distress   Bowel Sounds:  active   Lochia:  appropriate   Uterine Fundus:   firm   Incision:  no significant drainage, no dehiscence, no significant erythema   DVT Evaluation:  No evidence of DVT seen on physical exam.     CBC   Lab Results   Component Value Date    WBC 12.4 (H) 2022    HGB 7.4 (L) 2022    HCT 23.9 (L) 2022    MCV 77.1 (L) 2022     2022        Assessment:     Status post  section. Doing well postoperatively. Plan:     Continue current care.

## 2022-04-10 NOTE — LACTATION NOTE
Baby is receiving photo therapy for bilirubin levels. Observed the dyad during positioning and latch. Mom is confident and baby latches with ease. Encouraged mom to call me with breastfeeding questions or concerns.

## 2022-04-10 NOTE — LACTATION NOTE
Assisted mom in waking baby and attempting a breast feed. Baby will not latch even after much stimulation. Hand expressed several drops of colostrum into his mouth. Set up the [employer] electric breast pump to stimulate mom's supply and supplement baby with her own breast milk. Explained use and care.

## 2022-04-11 VITALS
BODY MASS INDEX: 39.7 KG/M2 | OXYGEN SATURATION: 96 % | WEIGHT: 247 LBS | HEIGHT: 66 IN | TEMPERATURE: 98 F | RESPIRATION RATE: 14 BRPM | HEART RATE: 100 BPM | DIASTOLIC BLOOD PRESSURE: 64 MMHG | SYSTOLIC BLOOD PRESSURE: 136 MMHG

## 2022-04-11 PROCEDURE — 6370000000 HC RX 637 (ALT 250 FOR IP): Performed by: OBSTETRICS & GYNECOLOGY

## 2022-04-11 PROCEDURE — 6360000002 HC RX W HCPCS: Performed by: OBSTETRICS & GYNECOLOGY

## 2022-04-11 RX ADMIN — IBUPROFEN 800 MG: 800 TABLET, FILM COATED ORAL at 07:45

## 2022-04-11 RX ADMIN — OXYCODONE AND ACETAMINOPHEN 2 TABLET: 5; 325 TABLET ORAL at 04:23

## 2022-04-11 RX ADMIN — ENOXAPARIN SODIUM 40 MG: 100 INJECTION SUBCUTANEOUS at 09:09

## 2022-04-11 RX ADMIN — SIMETHICONE 80 MG: 80 TABLET, CHEWABLE ORAL at 09:09

## 2022-04-11 RX ADMIN — DOCUSATE SODIUM 100 MG: 100 CAPSULE, LIQUID FILLED ORAL at 09:09

## 2022-04-11 RX ADMIN — OXYCODONE AND ACETAMINOPHEN 2 TABLET: 5; 325 TABLET ORAL at 10:47

## 2022-04-11 RX ADMIN — FERROUS SULFATE TAB 325 MG (65 MG ELEMENTAL FE) 325 MG: 325 (65 FE) TAB at 09:09

## 2022-04-11 ASSESSMENT — PAIN SCALES - GENERAL
PAINLEVEL_OUTOF10: 7
PAINLEVEL_OUTOF10: 5
PAINLEVEL_OUTOF10: 8

## 2022-04-11 NOTE — LACTATION NOTE
This mother is actively feeding with the SNS at the breast. Latch is comfortable, deep and appropriate. Mother has easier time BF at left breast d/t to her dexterity. Recommended cross cradle to right breast. Encouraged he call with next feeding so I can assist her with this technique to latch easily. Baby took 15 ml in 20 minutes with SNS.

## 2022-04-11 NOTE — FLOWSHEET NOTE
Active bowel sounds, passing gas, declines R/S at present, increased fluids and ambulation encouraged.

## 2022-04-11 NOTE — PROGRESS NOTES
CLINICAL PHARMACY NOTE: MEDS TO BEDS    Total # of Prescriptions Filled: 3   The following medications were delivered to the patient:  · Oxycodone 5-325 mg  ·  mg  · Ferrous sulfate 325 mg    Additional Documentation:

## 2022-04-11 NOTE — PLAN OF CARE
Problem: Discharge Planning:  Goal: Discharged to appropriate level of care  Description: Discharged to appropriate level of care  Outcome: Ongoing     Problem: Fluid Volume - Imbalance:  Goal: Absence of postpartum hemorrhage signs and symptoms  Description: Absence of postpartum hemorrhage signs and symptoms  Outcome: Ongoing  Goal: Absence of imbalanced fluid volume signs and symptoms  Description: Absence of imbalanced fluid volume signs and symptoms  Outcome: Ongoing     Problem: Infection - Surgical Site:  Goal: Will show no infection signs and symptoms  Description: Will show no infection signs and symptoms  Outcome: Ongoing     Problem: Pain - Acute:  Goal: Pain level will decrease  Description: Pain level will decrease  Outcome: Ongoing     Problem: Urinary Retention:  Goal: Urinary elimination within specified parameters  Description: Urinary elimination within specified parameters  Outcome: Ongoing     Problem: Venous Thromboembolism:  Goal: Will show no signs or symptoms of venous thromboembolism  Description: Will show no signs or symptoms of venous thromboembolism  Outcome: Ongoing  Goal: Absence of signs or symptoms of impaired coagulation  Description: Absence of signs or symptoms of impaired coagulation  Outcome: Ongoing     Problem: Pain:  Goal: Pain level will decrease  Description: Pain level will decrease  Outcome: Ongoing  Goal: Control of acute pain  Description: Control of acute pain  Outcome: Ongoing  Goal: Control of chronic pain  Description: Control of chronic pain  Outcome: Ongoing
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
## 2022-05-10 NOTE — DISCHARGE SUMMARY
Obstetrical Discharge Form    Gestational Age:  36w4d    Antepartum complications: gestational diabetes with polyhydramnios and fetal macrosomia    Date of Delivery:   2022    Type of Delivery:    for gestational diabetes with polyhydramnios and fetal macrosomia    Delivered By:    Denise Torres MD             Baby:       Information for the patient's :  Ovi Carmona [[id number]]        Anesthesia:    Spinal    Intrapartum complications: None    Feeding method:   breast    Postpartum complications: anemia    Discharge Date:   2022     Discharged to:                      Home    Condition at discharge:       Stable  Plan:   Follow up    in 1 week(s)

## 2022-08-24 ENCOUNTER — APPOINTMENT (OUTPATIENT)
Dept: CT IMAGING | Age: 27
End: 2022-08-24
Payer: COMMERCIAL

## 2022-08-24 ENCOUNTER — HOSPITAL ENCOUNTER (EMERGENCY)
Age: 27
Discharge: HOME | End: 2022-08-24
Payer: COMMERCIAL

## 2022-08-24 VITALS
SYSTOLIC BLOOD PRESSURE: 114 MMHG | HEART RATE: 82 BPM | WEIGHT: 196 LBS | OXYGEN SATURATION: 98 % | RESPIRATION RATE: 18 BRPM | BODY MASS INDEX: 31.64 KG/M2 | DIASTOLIC BLOOD PRESSURE: 76 MMHG | TEMPERATURE: 97.9 F

## 2022-08-24 DIAGNOSIS — K80.20 CALCULUS OF GALLBLADDER WITHOUT CHOLECYSTITIS WITHOUT OBSTRUCTION: Primary | ICD-10-CM

## 2022-08-24 DIAGNOSIS — R10.11 ABDOMINAL PAIN, RIGHT UPPER QUADRANT: ICD-10-CM

## 2022-08-24 LAB
ALBUMIN SERPL-MCNC: 4.2 G/DL (ref 3.5–5.2)
ALP BLD-CCNC: 92 U/L (ref 35–104)
ALT SERPL-CCNC: 31 U/L (ref 0–32)
ANION GAP SERPL CALCULATED.3IONS-SCNC: 11 MMOL/L (ref 7–16)
AST SERPL-CCNC: 15 U/L (ref 0–31)
BACTERIA: ABNORMAL /HPF
BASOPHILS ABSOLUTE: 0.02 E9/L (ref 0–0.2)
BASOPHILS RELATIVE PERCENT: 0.3 % (ref 0–2)
BILIRUB SERPL-MCNC: <0.2 MG/DL (ref 0–1.2)
BILIRUBIN URINE: NEGATIVE
BLOOD, URINE: NEGATIVE
BUN BLDV-MCNC: 18 MG/DL (ref 6–20)
CALCIUM SERPL-MCNC: 9.5 MG/DL (ref 8.6–10.2)
CHLORIDE BLD-SCNC: 110 MMOL/L (ref 98–107)
CLARITY: CLEAR
CO2: 23 MMOL/L (ref 22–29)
COLOR: YELLOW
CREAT SERPL-MCNC: 0.7 MG/DL (ref 0.5–1)
EOSINOPHILS ABSOLUTE: 0.17 E9/L (ref 0.05–0.5)
EOSINOPHILS RELATIVE PERCENT: 2.5 % (ref 0–6)
EPITHELIAL CELLS, UA: ABNORMAL /HPF
GFR AFRICAN AMERICAN: >60
GFR NON-AFRICAN AMERICAN: >60 ML/MIN/1.73
GLUCOSE BLD-MCNC: 104 MG/DL (ref 74–99)
GLUCOSE URINE: NEGATIVE MG/DL
HCG, URINE, POC: NEGATIVE
HCT VFR BLD CALC: 37 % (ref 34–48)
HEMOGLOBIN: 11.3 G/DL (ref 11.5–15.5)
IMMATURE GRANULOCYTES #: 0.02 E9/L
IMMATURE GRANULOCYTES %: 0.3 % (ref 0–5)
KETONES, URINE: NEGATIVE MG/DL
LACTIC ACID: 0.9 MMOL/L (ref 0.5–2.2)
LEUKOCYTE ESTERASE, URINE: NEGATIVE
LIPASE: 32 U/L (ref 13–60)
LYMPHOCYTES ABSOLUTE: 2.19 E9/L (ref 1.5–4)
LYMPHOCYTES RELATIVE PERCENT: 31.7 % (ref 20–42)
Lab: NORMAL
MCH RBC QN AUTO: 24.5 PG (ref 26–35)
MCHC RBC AUTO-ENTMCNC: 30.5 % (ref 32–34.5)
MCV RBC AUTO: 80.3 FL (ref 80–99.9)
MONOCYTES ABSOLUTE: 0.28 E9/L (ref 0.1–0.95)
MONOCYTES RELATIVE PERCENT: 4.1 % (ref 2–12)
NEGATIVE QC PASS/FAIL: NORMAL
NEUTROPHILS ABSOLUTE: 4.22 E9/L (ref 1.8–7.3)
NEUTROPHILS RELATIVE PERCENT: 61.1 % (ref 43–80)
NITRITE, URINE: NEGATIVE
PDW BLD-RTO: 17 FL (ref 11.5–15)
PH UA: 6 (ref 5–9)
PLATELET # BLD: 326 E9/L (ref 130–450)
PMV BLD AUTO: 10.6 FL (ref 7–12)
POSITIVE QC PASS/FAIL: NORMAL
POTASSIUM REFLEX MAGNESIUM: 4.1 MMOL/L (ref 3.5–5)
PROTEIN UA: NEGATIVE MG/DL
RBC # BLD: 4.61 E12/L (ref 3.5–5.5)
RBC UA: ABNORMAL /HPF (ref 0–2)
SODIUM BLD-SCNC: 144 MMOL/L (ref 132–146)
SPECIFIC GRAVITY UA: >=1.03 (ref 1–1.03)
TOTAL PROTEIN: 7.4 G/DL (ref 6.4–8.3)
UROBILINOGEN, URINE: 0.2 E.U./DL
WBC # BLD: 6.9 E9/L (ref 4.5–11.5)
WBC UA: ABNORMAL /HPF (ref 0–5)

## 2022-08-24 PROCEDURE — 83605 ASSAY OF LACTIC ACID: CPT

## 2022-08-24 PROCEDURE — 36415 COLL VENOUS BLD VENIPUNCTURE: CPT

## 2022-08-24 PROCEDURE — 81001 URINALYSIS AUTO W/SCOPE: CPT

## 2022-08-24 PROCEDURE — 80053 COMPREHEN METABOLIC PANEL: CPT

## 2022-08-24 PROCEDURE — 6360000004 HC RX CONTRAST MEDICATION: Performed by: RADIOLOGY

## 2022-08-24 PROCEDURE — 83690 ASSAY OF LIPASE: CPT

## 2022-08-24 PROCEDURE — 99285 EMERGENCY DEPT VISIT HI MDM: CPT

## 2022-08-24 PROCEDURE — 74177 CT ABD & PELVIS W/CONTRAST: CPT

## 2022-08-24 PROCEDURE — 85025 COMPLETE CBC W/AUTO DIFF WBC: CPT

## 2022-08-24 RX ORDER — IBUPROFEN 800 MG/1
800 TABLET ORAL EVERY 8 HOURS PRN
Qty: 15 TABLET | Refills: 0 | Status: SHIPPED | OUTPATIENT
Start: 2022-08-24 | End: 2022-09-15

## 2022-08-24 RX ORDER — ONDANSETRON 4 MG/1
4 TABLET, FILM COATED ORAL EVERY 8 HOURS PRN
Qty: 12 TABLET | Refills: 0 | Status: SHIPPED | OUTPATIENT
Start: 2022-08-24 | End: 2022-08-29

## 2022-08-24 RX ORDER — ACETAMINOPHEN 500 MG
1000 TABLET ORAL EVERY 8 HOURS PRN
Qty: 30 TABLET | Refills: 0 | Status: SHIPPED | OUTPATIENT
Start: 2022-08-24 | End: 2022-09-15

## 2022-08-24 RX ADMIN — IOPAMIDOL 50 ML: 755 INJECTION, SOLUTION INTRAVENOUS at 15:06

## 2022-08-24 ASSESSMENT — PAIN SCALES - GENERAL: PAINLEVEL_OUTOF10: 5

## 2022-08-24 ASSESSMENT — PAIN - FUNCTIONAL ASSESSMENT
PAIN_FUNCTIONAL_ASSESSMENT: NONE - DENIES PAIN
PAIN_FUNCTIONAL_ASSESSMENT: 0-10
PAIN_FUNCTIONAL_ASSESSMENT: NONE - DENIES PAIN

## 2022-08-24 ASSESSMENT — PAIN DESCRIPTION - ORIENTATION: ORIENTATION: RIGHT;UPPER

## 2022-08-24 ASSESSMENT — PAIN DESCRIPTION - LOCATION: LOCATION: ABDOMEN

## 2022-08-24 ASSESSMENT — PAIN DESCRIPTION - PAIN TYPE: TYPE: ACUTE PAIN

## 2022-08-24 ASSESSMENT — PAIN DESCRIPTION - FREQUENCY: FREQUENCY: CONTINUOUS
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
## 2022-08-29 ENCOUNTER — TELEPHONE (OUTPATIENT)
Dept: SURGERY | Age: 27
End: 2022-08-29

## 2022-08-29 ENCOUNTER — OFFICE VISIT (OUTPATIENT)
Dept: SURGERY | Age: 27
End: 2022-08-29
Payer: COMMERCIAL

## 2022-08-29 VITALS
HEART RATE: 85 BPM | DIASTOLIC BLOOD PRESSURE: 90 MMHG | TEMPERATURE: 97.9 F | BODY MASS INDEX: 31.5 KG/M2 | SYSTOLIC BLOOD PRESSURE: 138 MMHG | HEIGHT: 66 IN | WEIGHT: 196 LBS

## 2022-08-29 DIAGNOSIS — K80.20 SYMPTOMATIC CHOLELITHIASIS: Primary | ICD-10-CM

## 2022-08-29 PROCEDURE — G8417 CALC BMI ABV UP PARAM F/U: HCPCS | Performed by: SURGERY

## 2022-08-29 PROCEDURE — 1036F TOBACCO NON-USER: CPT | Performed by: SURGERY

## 2022-08-29 PROCEDURE — G8427 DOCREV CUR MEDS BY ELIG CLIN: HCPCS | Performed by: SURGERY

## 2022-08-29 PROCEDURE — 99204 OFFICE O/P NEW MOD 45 MIN: CPT | Performed by: SURGERY

## 2022-08-29 NOTE — TELEPHONE ENCOUNTER
Per the order of Dr. Ernie Lr, patient has been scheduled for Lap ildefonso on 22. .  Patient instructed to please contact our office with any questions. Surgery form faxed to [facility] and fax confirmation received. Dr. Ernie Lr to enter orders. Prior Authorization Form:      DEMOGRAPHICS:                     Patient Name:  Colt Scott  Patient :  1995            Insurance:  Payor: [employer] / Plan: [employer] ACCESS / Product Type: *No Product type* /   Insurance ID Number:    Payer/Plan Subscr  Sex Relation Sub. Ins. ID Effective Group Num   1.  [address] 1995 Female Self [id number] 1/1/15 [place]                                   [address] [address]         DIAGNOSIS & PROCEDURE:                       Procedure/Operation: Laparoscopic cholecystectomy           CPT Code: 94409    Diagnosis:  Symptomatic cholelithiasis    ICD10 Code: K80.20    Location:  [facility]    Surgeon:  Gary Rucker INFORMATION:                          Date: 22    Time: TBD              Anesthesia:  General                                                       Status:  Outpatient        Special Comments:  N/A       Electronically signed by Vignesh Hidalgo LPN on  at 3:31 PM
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
## 2022-08-31 ENCOUNTER — PREP FOR PROCEDURE (OUTPATIENT)
Dept: SURGERY | Age: 27
End: 2022-08-31

## 2022-08-31 RX ORDER — SODIUM CHLORIDE 0.9 % (FLUSH) 0.9 %
5-40 SYRINGE (ML) INJECTION PRN
Status: CANCELLED | OUTPATIENT
Start: 2022-08-31

## 2022-08-31 RX ORDER — SODIUM CHLORIDE 9 MG/ML
INJECTION, SOLUTION INTRAVENOUS PRN
Status: CANCELLED | OUTPATIENT
Start: 2022-08-31

## 2022-08-31 RX ORDER — SODIUM CHLORIDE, SODIUM LACTATE, POTASSIUM CHLORIDE, CALCIUM CHLORIDE 600; 310; 30; 20 MG/100ML; MG/100ML; MG/100ML; MG/100ML
INJECTION, SOLUTION INTRAVENOUS CONTINUOUS
Status: CANCELLED | OUTPATIENT
Start: 2022-08-31

## 2022-08-31 RX ORDER — SODIUM CHLORIDE 0.9 % (FLUSH) 0.9 %
5-40 SYRINGE (ML) INJECTION EVERY 12 HOURS SCHEDULED
Status: CANCELLED | OUTPATIENT
Start: 2022-08-31

## 2022-09-02 ENCOUNTER — HOSPITAL ENCOUNTER (OUTPATIENT)
Age: 27
Setting detail: OUTPATIENT SURGERY
Discharge: HOME | End: 2022-09-02
Attending: SURGERY | Admitting: SURGERY
Payer: COMMERCIAL

## 2022-09-02 ENCOUNTER — ANESTHESIA (OUTPATIENT)
Dept: OPERATING ROOM | Age: 27
End: 2022-09-02
Payer: COMMERCIAL

## 2022-09-02 ENCOUNTER — ANESTHESIA EVENT (OUTPATIENT)
Dept: OPERATING ROOM | Age: 27
End: 2022-09-02
Payer: COMMERCIAL

## 2022-09-02 VITALS
WEIGHT: 198 LBS | SYSTOLIC BLOOD PRESSURE: 117 MMHG | HEART RATE: 68 BPM | DIASTOLIC BLOOD PRESSURE: 69 MMHG | TEMPERATURE: 97.1 F | HEIGHT: 66 IN | OXYGEN SATURATION: 96 % | BODY MASS INDEX: 31.82 KG/M2 | RESPIRATION RATE: 16 BRPM

## 2022-09-02 DIAGNOSIS — G89.18 POST-OP PAIN: Primary | ICD-10-CM

## 2022-09-02 DIAGNOSIS — K81.9 CHOLECYSTITIS: ICD-10-CM

## 2022-09-02 PROBLEM — K80.20 SYMPTOMATIC CHOLELITHIASIS: Status: ACTIVE | Noted: 2022-09-02

## 2022-09-02 LAB
HCG, URINE, POC: NEGATIVE
Lab: NORMAL
NEGATIVE QC PASS/FAIL: NORMAL
POSITIVE QC PASS/FAIL: NORMAL

## 2022-09-02 PROCEDURE — 6360000002 HC RX W HCPCS

## 2022-09-02 PROCEDURE — 7100000001 HC PACU RECOVERY - ADDTL 15 MIN: Performed by: SURGERY

## 2022-09-02 PROCEDURE — 7100000000 HC PACU RECOVERY - FIRST 15 MIN: Performed by: SURGERY

## 2022-09-02 PROCEDURE — 3600000014 HC SURGERY LEVEL 4 ADDTL 15MIN: Performed by: SURGERY

## 2022-09-02 PROCEDURE — 2720000010 HC SURG SUPPLY STERILE: Performed by: SURGERY

## 2022-09-02 PROCEDURE — 2500000003 HC RX 250 WO HCPCS

## 2022-09-02 PROCEDURE — 99024 POSTOP FOLLOW-UP VISIT: CPT | Performed by: SURGERY

## 2022-09-02 PROCEDURE — 6360000002 HC RX W HCPCS: Performed by: SURGERY

## 2022-09-02 PROCEDURE — 7100000011 HC PHASE II RECOVERY - ADDTL 15 MIN: Performed by: SURGERY

## 2022-09-02 PROCEDURE — 2580000003 HC RX 258: Performed by: SURGERY

## 2022-09-02 PROCEDURE — 3700000001 HC ADD 15 MINUTES (ANESTHESIA): Performed by: SURGERY

## 2022-09-02 PROCEDURE — 7100000010 HC PHASE II RECOVERY - FIRST 15 MIN: Performed by: SURGERY

## 2022-09-02 PROCEDURE — 2709999900 HC NON-CHARGEABLE SUPPLY: Performed by: SURGERY

## 2022-09-02 PROCEDURE — 3700000000 HC ANESTHESIA ATTENDED CARE: Performed by: SURGERY

## 2022-09-02 PROCEDURE — 47562 LAPAROSCOPIC CHOLECYSTECTOMY: CPT | Performed by: SURGERY

## 2022-09-02 PROCEDURE — 88304 TISSUE EXAM BY PATHOLOGIST: CPT

## 2022-09-02 PROCEDURE — 3600000004 HC SURGERY LEVEL 4 BASE: Performed by: SURGERY

## 2022-09-02 PROCEDURE — 2500000003 HC RX 250 WO HCPCS: Performed by: SURGERY

## 2022-09-02 RX ORDER — SODIUM CHLORIDE 9 MG/ML
INJECTION, SOLUTION INTRAVENOUS PRN
Status: DISCONTINUED | OUTPATIENT
Start: 2022-09-02 | End: 2022-09-02 | Stop reason: HOSPADM

## 2022-09-02 RX ORDER — IPRATROPIUM BROMIDE AND ALBUTEROL SULFATE 2.5; .5 MG/3ML; MG/3ML
1 SOLUTION RESPIRATORY (INHALATION)
Status: DISCONTINUED | OUTPATIENT
Start: 2022-09-02 | End: 2022-09-02 | Stop reason: HOSPADM

## 2022-09-02 RX ORDER — DIPHENHYDRAMINE HYDROCHLORIDE 50 MG/ML
12.5 INJECTION INTRAMUSCULAR; INTRAVENOUS
Status: DISCONTINUED | OUTPATIENT
Start: 2022-09-02 | End: 2022-09-02 | Stop reason: HOSPADM

## 2022-09-02 RX ORDER — LABETALOL HYDROCHLORIDE 5 MG/ML
10 INJECTION, SOLUTION INTRAVENOUS
Status: DISCONTINUED | OUTPATIENT
Start: 2022-09-02 | End: 2022-09-02 | Stop reason: HOSPADM

## 2022-09-02 RX ORDER — PROPOFOL 10 MG/ML
INJECTION, EMULSION INTRAVENOUS PRN
Status: DISCONTINUED | OUTPATIENT
Start: 2022-09-02 | End: 2022-09-02 | Stop reason: SDUPTHER

## 2022-09-02 RX ORDER — SODIUM CHLORIDE 0.9 % (FLUSH) 0.9 %
5-40 SYRINGE (ML) INJECTION PRN
Status: DISCONTINUED | OUTPATIENT
Start: 2022-09-02 | End: 2022-09-02 | Stop reason: HOSPADM

## 2022-09-02 RX ORDER — SODIUM CHLORIDE, SODIUM LACTATE, POTASSIUM CHLORIDE, CALCIUM CHLORIDE 600; 310; 30; 20 MG/100ML; MG/100ML; MG/100ML; MG/100ML
INJECTION, SOLUTION INTRAVENOUS CONTINUOUS
Status: DISCONTINUED | OUTPATIENT
Start: 2022-09-02 | End: 2022-09-02 | Stop reason: HOSPADM

## 2022-09-02 RX ORDER — ONDANSETRON 2 MG/ML
4 INJECTION INTRAMUSCULAR; INTRAVENOUS
Status: COMPLETED | OUTPATIENT
Start: 2022-09-02 | End: 2022-09-02

## 2022-09-02 RX ORDER — GLYCOPYRROLATE 0.2 MG/ML
INJECTION INTRAMUSCULAR; INTRAVENOUS PRN
Status: DISCONTINUED | OUTPATIENT
Start: 2022-09-02 | End: 2022-09-02 | Stop reason: SDUPTHER

## 2022-09-02 RX ORDER — ONDANSETRON 2 MG/ML
INJECTION INTRAMUSCULAR; INTRAVENOUS
Status: COMPLETED
Start: 2022-09-02 | End: 2022-09-02

## 2022-09-02 RX ORDER — TRAMADOL HYDROCHLORIDE 50 MG/1
50 TABLET ORAL EVERY 6 HOURS PRN
Qty: 12 TABLET | Refills: 0 | Status: SHIPPED | OUTPATIENT
Start: 2022-09-02 | End: 2022-09-05

## 2022-09-02 RX ORDER — ROCURONIUM BROMIDE 10 MG/ML
INJECTION, SOLUTION INTRAVENOUS PRN
Status: DISCONTINUED | OUTPATIENT
Start: 2022-09-02 | End: 2022-09-02 | Stop reason: SDUPTHER

## 2022-09-02 RX ORDER — FENTANYL CITRATE 50 UG/ML
INJECTION, SOLUTION INTRAMUSCULAR; INTRAVENOUS PRN
Status: DISCONTINUED | OUTPATIENT
Start: 2022-09-02 | End: 2022-09-02 | Stop reason: SDUPTHER

## 2022-09-02 RX ORDER — DEXAMETHASONE SODIUM PHOSPHATE 10 MG/ML
INJECTION, SOLUTION INTRAMUSCULAR; INTRAVENOUS PRN
Status: DISCONTINUED | OUTPATIENT
Start: 2022-09-02 | End: 2022-09-02 | Stop reason: SDUPTHER

## 2022-09-02 RX ORDER — MORPHINE SULFATE 2 MG/ML
2 INJECTION, SOLUTION INTRAMUSCULAR; INTRAVENOUS EVERY 5 MIN PRN
Status: DISCONTINUED | OUTPATIENT
Start: 2022-09-02 | End: 2022-09-02 | Stop reason: HOSPADM

## 2022-09-02 RX ORDER — KETOROLAC TROMETHAMINE 30 MG/ML
INJECTION, SOLUTION INTRAMUSCULAR; INTRAVENOUS PRN
Status: DISCONTINUED | OUTPATIENT
Start: 2022-09-02 | End: 2022-09-02 | Stop reason: SDUPTHER

## 2022-09-02 RX ORDER — MIDAZOLAM HYDROCHLORIDE 1 MG/ML
2 INJECTION INTRAMUSCULAR; INTRAVENOUS
Status: DISCONTINUED | OUTPATIENT
Start: 2022-09-02 | End: 2022-09-02 | Stop reason: HOSPADM

## 2022-09-02 RX ORDER — NEOSTIGMINE METHYLSULFATE 1 MG/ML
INJECTION, SOLUTION INTRAVENOUS PRN
Status: DISCONTINUED | OUTPATIENT
Start: 2022-09-02 | End: 2022-09-02 | Stop reason: SDUPTHER

## 2022-09-02 RX ORDER — PROCHLORPERAZINE EDISYLATE 5 MG/ML
5 INJECTION INTRAMUSCULAR; INTRAVENOUS
Status: COMPLETED | OUTPATIENT
Start: 2022-09-02 | End: 2022-09-02

## 2022-09-02 RX ORDER — PROCHLORPERAZINE EDISYLATE 5 MG/ML
INJECTION INTRAMUSCULAR; INTRAVENOUS
Status: COMPLETED
Start: 2022-09-02 | End: 2022-09-02

## 2022-09-02 RX ORDER — METHOCARBAMOL 500 MG/1
500 TABLET, FILM COATED ORAL 4 TIMES DAILY
Qty: 40 TABLET | Refills: 0 | Status: SHIPPED | OUTPATIENT
Start: 2022-09-02 | End: 2022-09-12

## 2022-09-02 RX ORDER — LIDOCAINE HYDROCHLORIDE 20 MG/ML
INJECTION, SOLUTION EPIDURAL; INFILTRATION; INTRACAUDAL; PERINEURAL PRN
Status: DISCONTINUED | OUTPATIENT
Start: 2022-09-02 | End: 2022-09-02 | Stop reason: SDUPTHER

## 2022-09-02 RX ORDER — MIDAZOLAM HYDROCHLORIDE 1 MG/ML
INJECTION INTRAMUSCULAR; INTRAVENOUS PRN
Status: DISCONTINUED | OUTPATIENT
Start: 2022-09-02 | End: 2022-09-02 | Stop reason: SDUPTHER

## 2022-09-02 RX ORDER — MEPERIDINE HYDROCHLORIDE 25 MG/ML
12.5 INJECTION INTRAMUSCULAR; INTRAVENOUS; SUBCUTANEOUS EVERY 5 MIN PRN
Status: DISCONTINUED | OUTPATIENT
Start: 2022-09-02 | End: 2022-09-02 | Stop reason: HOSPADM

## 2022-09-02 RX ORDER — SODIUM CHLORIDE 0.9 % (FLUSH) 0.9 %
5-40 SYRINGE (ML) INJECTION EVERY 12 HOURS SCHEDULED
Status: DISCONTINUED | OUTPATIENT
Start: 2022-09-02 | End: 2022-09-02 | Stop reason: HOSPADM

## 2022-09-02 RX ORDER — KETOROLAC TROMETHAMINE 30 MG/ML
30 INJECTION, SOLUTION INTRAMUSCULAR; INTRAVENOUS
Status: DISCONTINUED | OUTPATIENT
Start: 2022-09-02 | End: 2022-09-02 | Stop reason: HOSPADM

## 2022-09-02 RX ORDER — LIDOCAINE HYDROCHLORIDE AND EPINEPHRINE 5; 5 MG/ML; UG/ML
INJECTION, SOLUTION INFILTRATION; PERINEURAL PRN
Status: DISCONTINUED | OUTPATIENT
Start: 2022-09-02 | End: 2022-09-02 | Stop reason: ALTCHOICE

## 2022-09-02 RX ORDER — HYDRALAZINE HYDROCHLORIDE 20 MG/ML
10 INJECTION INTRAMUSCULAR; INTRAVENOUS
Status: DISCONTINUED | OUTPATIENT
Start: 2022-09-02 | End: 2022-09-02 | Stop reason: HOSPADM

## 2022-09-02 RX ORDER — ONDANSETRON 2 MG/ML
INJECTION INTRAMUSCULAR; INTRAVENOUS PRN
Status: DISCONTINUED | OUTPATIENT
Start: 2022-09-02 | End: 2022-09-02 | Stop reason: SDUPTHER

## 2022-09-02 RX ADMIN — PROCHLORPERAZINE EDISYLATE 5 MG: 5 INJECTION INTRAMUSCULAR; INTRAVENOUS at 10:46

## 2022-09-02 RX ADMIN — Medication 100 MG: at 09:34

## 2022-09-02 RX ADMIN — FENTANYL CITRATE 50 MCG: 50 INJECTION, SOLUTION INTRAMUSCULAR; INTRAVENOUS at 09:42

## 2022-09-02 RX ADMIN — Medication 0.5 MG: at 10:36

## 2022-09-02 RX ADMIN — CEFAZOLIN 2000 MG: 2 INJECTION, POWDER, FOR SOLUTION INTRAMUSCULAR; INTRAVENOUS at 09:32

## 2022-09-02 RX ADMIN — HYDROMORPHONE HYDROCHLORIDE 0.5 MG: 1 INJECTION, SOLUTION INTRAMUSCULAR; INTRAVENOUS; SUBCUTANEOUS at 10:36

## 2022-09-02 RX ADMIN — MIDAZOLAM 2 MG: 1 INJECTION INTRAMUSCULAR; INTRAVENOUS at 09:29

## 2022-09-02 RX ADMIN — ROCURONIUM BROMIDE 40 MG: 10 INJECTION, SOLUTION INTRAVENOUS at 09:34

## 2022-09-02 RX ADMIN — GLYCOPYRROLATE 0.6 MG: 0.2 INJECTION INTRAMUSCULAR; INTRAVENOUS at 10:04

## 2022-09-02 RX ADMIN — KETOROLAC TROMETHAMINE 30 MG: 30 INJECTION, SOLUTION INTRAMUSCULAR at 10:04

## 2022-09-02 RX ADMIN — ONDANSETRON 4 MG: 2 INJECTION INTRAMUSCULAR; INTRAVENOUS at 10:39

## 2022-09-02 RX ADMIN — Medication 0.5 MG: at 10:52

## 2022-09-02 RX ADMIN — SODIUM CHLORIDE, POTASSIUM CHLORIDE, SODIUM LACTATE AND CALCIUM CHLORIDE: 600; 310; 30; 20 INJECTION, SOLUTION INTRAVENOUS at 10:04

## 2022-09-02 RX ADMIN — FENTANYL CITRATE 100 MCG: 50 INJECTION, SOLUTION INTRAMUSCULAR; INTRAVENOUS at 09:34

## 2022-09-02 RX ADMIN — Medication 3 MG: at 10:04

## 2022-09-02 RX ADMIN — SODIUM CHLORIDE, POTASSIUM CHLORIDE, SODIUM LACTATE AND CALCIUM CHLORIDE: 600; 310; 30; 20 INJECTION, SOLUTION INTRAVENOUS at 07:57

## 2022-09-02 RX ADMIN — HYDROMORPHONE HYDROCHLORIDE 0.5 MG: 1 INJECTION, SOLUTION INTRAMUSCULAR; INTRAVENOUS; SUBCUTANEOUS at 10:52

## 2022-09-02 RX ADMIN — DEXAMETHASONE SODIUM PHOSPHATE 10 MG: 10 INJECTION INTRAMUSCULAR; INTRAVENOUS at 09:34

## 2022-09-02 RX ADMIN — ONDANSETRON 4 MG: 2 INJECTION INTRAMUSCULAR; INTRAVENOUS at 10:04

## 2022-09-02 RX ADMIN — PROPOFOL 200 MG: 10 INJECTION, EMULSION INTRAVENOUS at 09:34

## 2022-09-02 ASSESSMENT — LIFESTYLE VARIABLES: SMOKING_STATUS: 0

## 2022-09-02 ASSESSMENT — PAIN DESCRIPTION - DESCRIPTORS
DESCRIPTORS: SORE;SHARP;ACHING
DESCRIPTORS: DISCOMFORT

## 2022-09-02 ASSESSMENT — PAIN SCALES - GENERAL
PAINLEVEL_OUTOF10: 4
PAINLEVEL_OUTOF10: 7

## 2022-09-02 ASSESSMENT — PAIN - FUNCTIONAL ASSESSMENT: PAIN_FUNCTIONAL_ASSESSMENT: 0-10

## 2022-09-02 ASSESSMENT — PAIN DESCRIPTION - LOCATION: LOCATION: ABDOMEN
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
## 2022-09-02 NOTE — OP NOTE
POST-OPERATIVE INSTRUCTIONS FOR GALLBLADDER SURGERY    Call the office at [phone] to schedule your post-operative appointment with Dr. Rachael Singh for two (2) weeks. You will have surgical glue closing your incisions, you may shower 24hours after your surgery but do not rub off glue, it will come off on its own over time. Do not bathe for 3 days after your surgery, shower only and pat incisions dry after   Shower. General guidelines for activity:   Avoid strenuous activity or lifting anything heavier than 25 pounds for 2 weeks. It is OK to be up  walking around, and walking up and down stairs. Do what is comfortable: stop and rest when you feel tired. Drink plenty of fluids and stay on a bland diet for 2-3 days after surgery. Lizzy Hippo for general diet     Do NOT drive while taking your narcotic pain medicine. Watch for signs of infection:  Excessive warmth or bright redness around your incisions  Leakage of bloody or cloudy fluid from you incisions  Fever over 100.5    During the laparoscopic procedure that you had, gas is pumped into the abdominal cavity. You may feel abdominal, shoulder, or rib pain for a few days due to this gas. You will have pain medicine ordered. Take as directed    BOWELS: constipation is a side effect of your pain meds, take a daily laxative (miralax, dulcolax, etc.) as needed to keep your bowels moving as they normally do, do not go 2-3 days without having a bowel movement. Pain medications; Pain meds cause constipation       It is ok to have your regular diet, without restrictions but if you develop diarrhea, cut out fatty foods of all types for 1-2 weeks and then slowly reintroduce them. Your body should adjust by that time. No lifting over 25lbs for two week then no limitations at that time.

## 2022-09-02 NOTE — DISCHARGE INSTRUCTIONS
POST-OPERATIVE INSTRUCTIONS FOR GALLBLADDER SURGERY    Call the office at [phone] to schedule your post-operative appointment with Dr. Quincy Madrid for two (2) weeks. You will have surgical glue closing your incisions, you may shower 24hours after your surgery but do not rub off glue, it will come off on its own over time. Do not bathe for 3 days after your surgery, shower only and pat incisions dry after   Shower. General guidelines for activity:   Avoid strenuous activity or lifting anything heavier than 25 pounds for 2 weeks. It is OK to be up  walking around, and walking up and down stairs. Do what is comfortable: stop and rest when you feel tired. Drink plenty of fluids and stay on a bland diet for 2-3 days after surgery. [id number] [address] Love for general diet     Do NOT drive while taking your narcotic pain medicine. Watch for signs of infection:  Excessive warmth or bright redness around your incisions  Leakage of bloody or cloudy fluid from you incisions  Fever over 100.5    During the laparoscopic procedure that you had, gas is pumped into the abdominal cavity. You may feel abdominal, shoulder, or rib pain for a few days due to this gas. You will have pain medicine ordered. Take as directed    BOWELS: constipation is a side effect of your pain meds, take a daily laxative (miralax, dulcolax, etc.) as needed to keep your bowels moving as they normally do, do not go 2-3 days without having a bowel movement. Pain medications; Pain meds cause constipation       It is ok to have your regular diet, without restrictions but if you develop diarrhea, cut out fatty foods of all types for 1-2 weeks and then slowly reintroduce them. Your body should adjust by that time. No lifting over 25lbs for two week then no limitations at that time.         Do not breast feed while taking the medications I prescribed

## 2022-09-08 NOTE — OP NOTE
DATE OF PROCEDURE:  9/8/2022      SURGEON:  Shravan Freed M.D.      ASSISTANT:  gavin      PREOPERATIVE DIAGNOSIS:  symptomatic cholelithiasis. POSTOPERATIVE DIAGNOSIS:  same      OPERATION:  Laparoscopic cholecystectomy. ANESTHESIA:  General.      ESTIMATED BLOOD LOSS:minimal      COMPLICATIONS:  None. FLUIDS:  Crystalloid. SPECIMEN:  Gallbladder. DISPOSITION:  To  home. INDICATION:  Thierry Krishna is a 33 y.o. female who presented     for evaluation of right upper quadrant abdominal pain consistent with  stones. We explained the risks, benefits, potential outcomes, and   alternatives of treatment to the aforementioned procedure, including risk of   potential biliary leak or bile duct injury requiring further surgeries, infection or   bleeding requiring procedure or surgeries. she agreed to proceed   understanding those risks and potential outcomes. PROCEDURE:  The patient was brought into the operative suite and was given   preoperative antibiotics 30 minutes before incision, was placed under general   anesthesia, and then was prepped and draped in normal sterile condition. Once this was done, a 5-mm incision was made supraumbilically and a Veress   needle was passed through this incision into the peritoneum. Once this was done, CO2 was used to insufflate the abdomen to a pressure of 15 mmHg. At that time, the Veress needle was removed and replaced with a 5-mm trocar. The laparoscope was placed through that trocar and port, and once this was done, under direct visualization with   the laparoscope, an additional  10-mm port was placed in the subxiphoid area. Two right lateral abdominal ports were placed, 5 mm in size, under direct   visualization with the laparoscope. Once this was done, the gallbladder was retracted cephaladly with blunt   graspers.   Blunt dissection as well as electrocautery were able to free the   gallbladder and expose the cystic duct and artery. These were taken with   ligamax clip appliers, 2 distally and 1 proximally and then ligated with   Endoshears. Electrocautery then was able to remove the gallbladder from   liver bed. Any bleeding was electrocauterized for hemostasis. The abdomen   was  then suction irrigated until the aspirate returned clear and the   gallbladder was removed previous to this with the bag. It was sent for   specimen at that time. The 10-mm abdominal incision and defect in the fascia was closed in a   figure-of-8 fashion with 0 Vicryl suture. Once this was done, the skin was   approximated with 4-0 Monocryl suture in a subcuticular fashion. The patient   was then woken up in stable and taken to PACU.     Peyman Thibodeaux MD  7:08 AM  9/8/2022

## 2022-09-15 ENCOUNTER — OFFICE VISIT (OUTPATIENT)
Dept: SURGERY | Age: 27
End: 2022-09-15

## 2022-09-15 VITALS
SYSTOLIC BLOOD PRESSURE: 127 MMHG | WEIGHT: 198 LBS | BODY MASS INDEX: 31.82 KG/M2 | TEMPERATURE: 97.7 F | HEART RATE: 86 BPM | DIASTOLIC BLOOD PRESSURE: 82 MMHG | HEIGHT: 66 IN

## 2022-09-15 DIAGNOSIS — Z09 POSTOPERATIVE EXAMINATION: Primary | ICD-10-CM

## 2022-09-15 PROCEDURE — 99024 POSTOP FOLLOW-UP VISIT: CPT | Performed by: SURGERY

## 2022-09-15 NOTE — PROGRESS NOTES
Surgery Progress Note            Chief complaint:   Patient Active Problem List   Diagnosis    Closed head injury    Fall (on)(from) sidewalk curb, initial encounter    LGA (large for gestational age) fetus affecting mother, antepartum, third trimester, other fetus    Symptomatic cholelithiasis       S: doing well    O:   Vitals:    09/15/22 0918   BP: 127/82   Pulse: 86   Temp: 97.7 °F (36.5 °C)     No intake or output data in the 24 hours ending 09/15/22 0937        Labs:  No results for input(s): WBC, HGB, HCT in the last 72 hours. Invalid input(s): PLR  Lab Results   Component Value Date    CREATININE 0.7 08/24/2022    BUN 18 08/24/2022     08/24/2022    K 4.1 08/24/2022     (H) 08/24/2022    CO2 23 08/24/2022     No results for input(s): LIPASE, AMYLASE in the last 72 hours. Physical exam:   /82   Pulse 86   Temp 97.7 °F (36.5 °C) (Temporal)   Ht 5' 6\" (1.676 m)   Wt 198 lb (89.8 kg)   BMI 31.96 kg/m²   General appearance: NAD  Head: NCAT  Neck: supple, no masses  Lungs: equal chest rise bilateral  Heart: S1S2 present  Abdomen: soft, nontender, nondistended  Skin; no new lesions, incisions clean and intact  Gu: no cva tenderness  Extremities: extremities normal, atraumatic, no cyanosis or edema    A:  Post op lap [email or web address]    P: follow up as needed.      Ar Quarles MD, MD  9/15/2022

## 2024-05-01 ENCOUNTER — HOSPITAL ENCOUNTER (OUTPATIENT)
Age: 29
Discharge: HOME | End: 2024-05-01
Payer: COMMERCIAL

## 2024-05-01 DIAGNOSIS — Z77.011 LEAD EXPOSURE: ICD-10-CM

## 2024-05-01 PROCEDURE — 83655 ASSAY OF LEAD: CPT

## 2024-05-01 PROCEDURE — 36415 COLL VENOUS BLD VENIPUNCTURE: CPT

## 2024-05-03 LAB — LEAD BLDV-MCNC: <2 UG/DL

## 2024-07-08 ENCOUNTER — APPOINTMENT (OUTPATIENT)
Dept: GENERAL RADIOLOGY | Age: 29
End: 2024-07-08
Payer: COMMERCIAL

## 2024-07-08 ENCOUNTER — HOSPITAL ENCOUNTER (EMERGENCY)
Age: 29
Discharge: HOME | End: 2024-07-08
Attending: EMERGENCY MEDICINE
Payer: COMMERCIAL

## 2024-07-08 ENCOUNTER — APPOINTMENT (OUTPATIENT)
Dept: CT IMAGING | Age: 29
End: 2024-07-08
Payer: COMMERCIAL

## 2024-07-08 VITALS
DIASTOLIC BLOOD PRESSURE: 80 MMHG | RESPIRATION RATE: 19 BRPM | BODY MASS INDEX: 32.6 KG/M2 | HEART RATE: 76 BPM | WEIGHT: 202 LBS | SYSTOLIC BLOOD PRESSURE: 136 MMHG | TEMPERATURE: 97.6 F | OXYGEN SATURATION: 98 %

## 2024-07-08 DIAGNOSIS — R74.8 ELEVATED LIVER ENZYMES: Primary | ICD-10-CM

## 2024-07-08 DIAGNOSIS — R10.11 RIGHT UPPER QUADRANT ABDOMINAL PAIN: ICD-10-CM

## 2024-07-08 DIAGNOSIS — N30.00 ACUTE CYSTITIS WITHOUT HEMATURIA: ICD-10-CM

## 2024-07-08 LAB
ALBUMIN SERPL-MCNC: 4.5 G/DL (ref 3.5–5.2)
ALP SERPL-CCNC: 106 U/L (ref 35–104)
ALT SERPL-CCNC: 765 U/L (ref 0–32)
ANION GAP SERPL CALCULATED.3IONS-SCNC: 11 MMOL/L (ref 7–16)
APAP SERPL-MCNC: <5 UG/ML (ref 10–30)
AST SERPL-CCNC: 636 U/L (ref 0–31)
BACTERIA URNS QL MICRO: ABNORMAL
BASOPHILS # BLD: 0.03 K/UL (ref 0–0.2)
BASOPHILS NFR BLD: 1 % (ref 0–2)
BILIRUB SERPL-MCNC: 0.5 MG/DL (ref 0–1.2)
BILIRUB UR QL STRIP: NEGATIVE
BUN SERPL-MCNC: 11 MG/DL (ref 6–20)
CALCIUM SERPL-MCNC: 9.3 MG/DL (ref 8.6–10.2)
CHLORIDE SERPL-SCNC: 104 MMOL/L (ref 98–107)
CLARITY UR: ABNORMAL
CO2 SERPL-SCNC: 23 MMOL/L (ref 22–29)
COLOR UR: YELLOW
CREAT SERPL-MCNC: 0.7 MG/DL (ref 0.5–1)
EOSINOPHIL # BLD: 0.07 K/UL (ref 0.05–0.5)
EOSINOPHILS RELATIVE PERCENT: 1 % (ref 0–6)
EPI CELLS #/AREA URNS HPF: ABNORMAL /HPF
ERYTHROCYTE [DISTWIDTH] IN BLOOD BY AUTOMATED COUNT: 14.9 % (ref 11.5–15)
ETHANOLAMINE SERPL-MCNC: <10 MG/DL (ref 0–0.08)
GFR, ESTIMATED: >90 ML/MIN/1.73M2
GLUCOSE SERPL-MCNC: 105 MG/DL (ref 74–99)
GLUCOSE UR STRIP-MCNC: NEGATIVE MG/DL
HCG, URINE, POC: NEGATIVE
HCT VFR BLD AUTO: 35.8 % (ref 34–48)
HGB BLD-MCNC: 11.6 G/DL (ref 11.5–15.5)
HGB UR QL STRIP.AUTO: ABNORMAL
IMM GRANULOCYTES # BLD AUTO: <0.03 K/UL (ref 0–0.58)
IMM GRANULOCYTES NFR BLD: 0 % (ref 0–5)
INR PPP: 1.2
KETONES UR STRIP-MCNC: NEGATIVE MG/DL
LACTATE BLDV-SCNC: 0.9 MMOL/L (ref 0.5–2.2)
LEUKOCYTE ESTERASE UR QL STRIP: ABNORMAL
LIPASE SERPL-CCNC: 28 U/L (ref 13–60)
LYMPHOCYTES NFR BLD: 1.43 K/UL (ref 1.5–4)
LYMPHOCYTES RELATIVE PERCENT: 24 % (ref 20–42)
Lab: NORMAL
MCH RBC QN AUTO: 27.2 PG (ref 26–35)
MCHC RBC AUTO-ENTMCNC: 32.4 G/DL (ref 32–34.5)
MCV RBC AUTO: 83.8 FL (ref 80–99.9)
MONOCYTES NFR BLD: 0.32 K/UL (ref 0.1–0.95)
MONOCYTES NFR BLD: 5 % (ref 2–12)
NEGATIVE QC PASS/FAIL: NORMAL
NEUTROPHILS NFR BLD: 69 % (ref 43–80)
NEUTS SEG NFR BLD: 4.12 K/UL (ref 1.8–7.3)
NITRITE UR QL STRIP: POSITIVE
PARTIAL THROMBOPLASTIN TIME: 30.2 SEC (ref 24.5–35.1)
PH UR STRIP: 6 [PH] (ref 5–9)
PLATELET # BLD AUTO: 293 K/UL (ref 130–450)
PMV BLD AUTO: 10 FL (ref 7–12)
POSITIVE QC PASS/FAIL: NORMAL
POTASSIUM SERPL-SCNC: 3.7 MMOL/L (ref 3.5–5)
PROT SERPL-MCNC: 7.7 G/DL (ref 6.4–8.3)
PROT UR STRIP-MCNC: NEGATIVE MG/DL
PROTHROMBIN TIME: 12.6 SEC (ref 9.3–12.4)
RBC # BLD AUTO: 4.27 M/UL (ref 3.5–5.5)
RBC #/AREA URNS HPF: ABNORMAL /HPF
SALICYLATES SERPL-MCNC: <0.3 MG/DL (ref 0–30)
SODIUM SERPL-SCNC: 138 MMOL/L (ref 132–146)
SP GR UR STRIP: 1.01 (ref 1–1.03)
TOXIC TRICYCLIC SC,BLOOD: NEGATIVE
UROBILINOGEN UR STRIP-ACNC: 0.2 EU/DL (ref 0–1)
WBC #/AREA URNS HPF: ABNORMAL /HPF
WBC OTHER # BLD: 6 K/UL (ref 4.5–11.5)

## 2024-07-08 PROCEDURE — 80143 DRUG ASSAY ACETAMINOPHEN: CPT

## 2024-07-08 PROCEDURE — 80307 DRUG TEST PRSMV CHEM ANLYZR: CPT

## 2024-07-08 PROCEDURE — 96374 THER/PROPH/DIAG INJ IV PUSH: CPT

## 2024-07-08 PROCEDURE — 80179 DRUG ASSAY SALICYLATE: CPT

## 2024-07-08 PROCEDURE — 99285 EMERGENCY DEPT VISIT HI MDM: CPT

## 2024-07-08 PROCEDURE — 85025 COMPLETE CBC W/AUTO DIFF WBC: CPT

## 2024-07-08 PROCEDURE — 74018 RADEX ABDOMEN 1 VIEW: CPT

## 2024-07-08 PROCEDURE — 6360000002 HC RX W HCPCS: Performed by: EMERGENCY MEDICINE

## 2024-07-08 PROCEDURE — 6370000000 HC RX 637 (ALT 250 FOR IP): Performed by: EMERGENCY MEDICINE

## 2024-07-08 PROCEDURE — 2580000003 HC RX 258: Performed by: EMERGENCY MEDICINE

## 2024-07-08 PROCEDURE — 80053 COMPREHEN METABOLIC PANEL: CPT

## 2024-07-08 PROCEDURE — 85610 PROTHROMBIN TIME: CPT

## 2024-07-08 PROCEDURE — 83690 ASSAY OF LIPASE: CPT

## 2024-07-08 PROCEDURE — 80074 ACUTE HEPATITIS PANEL: CPT

## 2024-07-08 PROCEDURE — 74177 CT ABD & PELVIS W/CONTRAST: CPT

## 2024-07-08 PROCEDURE — 81001 URINALYSIS AUTO W/SCOPE: CPT

## 2024-07-08 PROCEDURE — 83605 ASSAY OF LACTIC ACID: CPT

## 2024-07-08 PROCEDURE — 6360000004 HC RX CONTRAST MEDICATION: Performed by: RADIOLOGY

## 2024-07-08 PROCEDURE — 85730 THROMBOPLASTIN TIME PARTIAL: CPT

## 2024-07-08 PROCEDURE — G0480 DRUG TEST DEF 1-7 CLASSES: HCPCS

## 2024-07-08 RX ORDER — KETOROLAC TROMETHAMINE 15 MG/ML
15 INJECTION, SOLUTION INTRAMUSCULAR; INTRAVENOUS ONCE
Status: COMPLETED | OUTPATIENT
Start: 2024-07-08 | End: 2024-07-08

## 2024-07-08 RX ORDER — 0.9 % SODIUM CHLORIDE 0.9 %
1000 INTRAVENOUS SOLUTION INTRAVENOUS ONCE
Status: COMPLETED | OUTPATIENT
Start: 2024-07-08 | End: 2024-07-08

## 2024-07-08 RX ORDER — CEFDINIR 300 MG/1
300 CAPSULE ORAL ONCE
Status: COMPLETED | OUTPATIENT
Start: 2024-07-08 | End: 2024-07-08

## 2024-07-08 RX ORDER — CEFDINIR 300 MG/1
300 CAPSULE ORAL 2 TIMES DAILY
Qty: 14 CAPSULE | Refills: 0 | Status: SHIPPED | OUTPATIENT
Start: 2024-07-08 | End: 2024-07-15

## 2024-07-08 RX ADMIN — CEFDINIR 300 MG: 300 CAPSULE ORAL at 14:45

## 2024-07-08 RX ADMIN — KETOROLAC TROMETHAMINE 15 MG: 15 INJECTION, SOLUTION INTRAMUSCULAR; INTRAVENOUS at 09:44

## 2024-07-08 RX ADMIN — SODIUM CHLORIDE 1000 ML: 9 INJECTION, SOLUTION INTRAVENOUS at 09:45

## 2024-07-08 RX ADMIN — IOPAMIDOL 75 ML: 755 INJECTION, SOLUTION INTRAVENOUS at 12:05

## 2024-07-08 ASSESSMENT — ENCOUNTER SYMPTOMS
COUGH: 0
ABDOMINAL PAIN: 1
DIARRHEA: 0
CHEST TIGHTNESS: 0
CONSTIPATION: 1
VOMITING: 1
NAUSEA: 1
SHORTNESS OF BREATH: 0

## 2024-07-08 ASSESSMENT — PAIN DESCRIPTION - LOCATION: LOCATION: STERNUM

## 2024-07-08 ASSESSMENT — PAIN SCALES - GENERAL: PAINLEVEL_OUTOF10: 4

## 2024-07-08 NOTE — ED PROVIDER NOTES
CO2 23 22 - 29 mmol/L    Anion Gap 11 7 - 16 mmol/L    Glucose 105 (H) 74 - 99 mg/dL    BUN 11 6 - 20 mg/dL    Creatinine 0.7 0.50 - 1.00 mg/dL    Est, Glom Filt Rate >90 >60 mL/min/1.73m2    Calcium 9.3 8.6 - 10.2 mg/dL    Total Protein 7.7 6.4 - 8.3 g/dL    Albumin 4.5 3.5 - 5.2 g/dL    Total Bilirubin 0.5 0.0 - 1.2 mg/dL    Alkaline Phosphatase 106 (H) 35 - 104 U/L     (H) 0 - 32 U/L     (H) 0 - 31 U/L   Lipase   Result Value Ref Range    Lipase 28 13 - 60 U/L   Lactic Acid   Result Value Ref Range    Lactic Acid 0.9 0.5 - 2.2 mmol/L   SERUM DRUG SCREEN   Result Value Ref Range    Acetaminophen Level <5 (L) 10.0 - 30.0 ug/mL    Ethanol Lvl <10 <10 mg/dL    Salicylate Lvl <0.3 0.0 - 30.0 mg/dL    Toxic Tricyclic Sc,Blood PENDING NEGATIVE   Protime-INR   Result Value Ref Range    Protime 12.6 (H) 9.3 - 12.4 sec    INR 1.2    APTT   Result Value Ref Range    APTT 30.2 24.5 - 35.1 sec   URINE DRUG SCREEN   Result Value Ref Range    Amphetamine Screen, Ur NEGATIVE NEGATIVE    Barbiturate Screen, Ur NEGATIVE NEGATIVE    Benzodiazepine Screen, Urine NEGATIVE NEGATIVE    Cocaine Metabolite, Urine NEGATIVE NEGATIVE    Methadone Screen, Urine NEGATIVE NEGATIVE    Opiates, Urine NEGATIVE NEGATIVE    Phencyclidine, Urine NEGATIVE NEGATIVE    Cannabinoid Scrn, Ur NEGATIVE NEGATIVE    Oxycodone Screen, Ur NEGATIVE NEGATIVE    Fentanyl, Ur PENDING NEGATIVE    Test Information       These drug screen results are for medical purposes only and should not be considered definitive or confirmed.   POC Pregnancy Urine Qual   Result Value Ref Range    HCG, Urine, POC Negative Negative    Lot Number [id number]     Positive QC Pass/Fail Pass     Negative QC Pass/Fail Pass        Radiology:  CT ABDOMEN PELVIS W IV CONTRAST Additional Contrast? None   Final Result   No acute process in the abdomen/pelvis.         XR ABDOMEN (KUB) (SINGLE AP VIEW)   Final Result   No acute radiographic findings.

## 2024-07-08 NOTE — ED NOTES
INITIAL CONTACT WITH PT, HAND WASHING PERFORMED.  ALERT AND ORIENTED TIMES 4. SKIN WARM AND DRY. RESPIRATIONS EVEN AND UNLABORED. LS CLEAR. GRASPS AND PUMPS EQUAL AND STRONG. ABD SOFT AND NON-DISTENDED. BS HYPOACTIVE. ABD NON TENDER. DENIES ANY ABD PAIN AND DENYING ANY NAUSEA VOMITING OR DIARRHEA AT CURRENT TIME. STATES THAT SHE VOMITED LAST NIGHT. DENYING  SOB. REPORTS MID STERNAL CP STATES THAT SHE THOUGHT IT WAS INDIGESTION AND PAIN NON RADIATING. DENYING ANY TRAUMA. STATES IT STARTED WHEN SHE WAS AT A PARTY. NO DISTRESS NOTED.

## 2024-07-09 LAB
AMPHET UR QL SCN: NEGATIVE
BARBITURATES UR QL SCN: NEGATIVE
BENZODIAZ UR QL: NEGATIVE
BUPRENORPHINE UR QL: NEGATIVE
CANNABINOIDS UR QL SCN: NEGATIVE
COCAINE UR QL SCN: NEGATIVE
FENTANYL UR QL: NEGATIVE
HAV IGM SERPL QL IA: NONREACTIVE
HBV CORE IGM SERPL QL IA: NONREACTIVE
HBV SURFACE AG SERPL QL IA: NONREACTIVE
HCV AB SERPL QL IA: NONREACTIVE
METHADONE UR QL: NEGATIVE
OPIATES UR QL SCN: NEGATIVE
OXYCODONE UR QL SCN: NEGATIVE
PCP UR QL SCN: NEGATIVE
TEST INFORMATION: NORMAL

## 2024-07-11 ENCOUNTER — HOSPITAL ENCOUNTER (OUTPATIENT)
Age: 29
Discharge: HOME | End: 2024-07-11
Payer: COMMERCIAL

## 2024-07-11 LAB
EKG ATRIAL RATE: 63 BPM
EKG P AXIS: 46 DEGREES
EKG P-R INTERVAL: 134 MS
EKG Q-T INTERVAL: 396 MS
EKG QRS DURATION: 88 MS
EKG QTC CALCULATION (BAZETT): 405 MS
EKG R AXIS: 75 DEGREES
EKG T AXIS: 54 DEGREES
EKG VENTRICULAR RATE: 63 BPM

## 2024-07-11 PROCEDURE — 93005 ELECTROCARDIOGRAM TRACING: CPT | Performed by: INTERNAL MEDICINE

## (undated) DEVICE — INSUFFLATION NEEDLE TO ESTABLISH PNEUMOPERITONEUM.: Brand: INSUFFLATION NEEDLE

## (undated) DEVICE — PEN: MARKING STD 100/CS: Brand: MEDICAL ACTION INDUSTRIES

## (undated) DEVICE — PUMP SUC IRR TBNG L10FT W/ HNDPC ASSEMB STRYKEFLOW 2

## (undated) DEVICE — COUNTER NDL 30 COUNT DBL MAG

## (undated) DEVICE — SUTURE VCRL + SZ 0 L36IN ABSRB VLT L36MM CT-1 1/2 CIR VCP346H

## (undated) DEVICE — TUBING, SUCTION, 3/16" X 12', STRAIGHT: Brand: MEDLINE

## (undated) DEVICE — [HIGH FLOW INSUFFLATOR,  DO NOT USE IF PACKAGE IS DAMAGED,  KEEP DRY,  KEEP AWAY FROM SUNLIGHT,  PROTECT FROM HEAT AND RADIOACTIVE SOURCES.]: Brand: PNEUMOSURE

## (undated) DEVICE — NEEDLE HYPO 25GA L1.5IN BLU POLYPR HUB S STL REG BVL STR

## (undated) DEVICE — TELFA ADHESIVE ISLAND DRESSING: Brand: TELFA

## (undated) DEVICE — HYPODERMIC SAFETY NEEDLE: Brand: MAGELLAN

## (undated) DEVICE — APPLICATOR MEDICATED 26 CC SOLUTION HI LT ORNG CHLORAPREP

## (undated) DEVICE — 3000CC GUARDIAN II: Brand: GUARDIAN

## (undated) DEVICE — SOLUTION IRRIG 3000ML 0.9% SOD CHL USP UROMATIC PLAS CONT

## (undated) DEVICE — SKIN AFFIX SURG ADHESIVE 72/CS 0.55ML: Brand: MEDLINE

## (undated) DEVICE — ELECTRODE PT RET AD L9FT HI MOIST COND ADH HYDRGEL CORDED

## (undated) DEVICE — PMI PTFE COATED LAPAROSCOPIC WIRE L-HOOK 44 CM: Brand: PMI

## (undated) DEVICE — STAPLER SKIN SQ 30 ABSRB STPL DISP INSORB

## (undated) DEVICE — MARKER,SKIN,WI/RULER AND LABELS: Brand: MEDLINE

## (undated) DEVICE — SHEET,DRAPE,53X77,STERILE: Brand: MEDLINE

## (undated) DEVICE — PACK SURG LAP CHOLE CUSTOM

## (undated) DEVICE — DOUBLE BASIN SET: Brand: MEDLINE INDUSTRIES, INC.

## (undated) DEVICE — APPLIER CLP M/L SHFT DIA5MM 15 LIG LIGAMAX 5

## (undated) DEVICE — APPLICATOR PREP 26ML 0.7% IOD POVACRYLEX 74% ISO ALC ST

## (undated) DEVICE — GARMENT,MEDLINE,DVT,INT,CALF,MED, GEN2: Brand: MEDLINE

## (undated) DEVICE — BLADE SURG NO20 S STL STR DISP GLASSVAN

## (undated) DEVICE — TROCAR: Brand: KII SLEEVE

## (undated) DEVICE — COVER,LIGHT HANDLE,FLX,1/PK: Brand: MEDLINE INDUSTRIES, INC.

## (undated) DEVICE — SPONGE LAP W18XL18IN WHT COT 4 PLY FLD STRUNG RADPQ DISP ST

## (undated) DEVICE — COVER,LIGHT HANDLE,FLX,2/PK: Brand: MEDLINE INDUSTRIES, INC.

## (undated) DEVICE — SUTURE STRATAFIX SPRL SZ 1 L14IN ABSRB VLT L48CM CTX 1/2 SXPD2B405

## (undated) DEVICE — CONTAINER SPEC 64OZ POLYPR PATH SNAP LOK CAP W/ LID

## (undated) DEVICE — CATHETERIZATION KIT FOL16 FR 2000 CC DRAINAGE BG LUBRICATH

## (undated) DEVICE — TUBE BLD COLLECT ST 1 SIL COAT 7ML 10ML

## (undated) DEVICE — CONTAINER,SPEC,PNEUM TUBE,3OZ,STRL PATH: Brand: MEDLINE

## (undated) DEVICE — CESAREAN BIRTH PACK II: Brand: MEDLINE INDUSTRIES, INC.

## (undated) DEVICE — GLOVE ORANGE PI 8   MSG9080

## (undated) DEVICE — STRIP,CLOSURE,WOUND,MEDI-STRIP,1/2X4: Brand: MEDLINE

## (undated) DEVICE — 3M™ STERI-STRIP™ COMPOUND BENZOIN TINCTURE 40 BAGS/CARTON 4 CARTONS/CASE C1544: Brand: 3M™ STERI-STRIP™

## (undated) DEVICE — GLOVE SURG SZ 75 L12IN FNGR THK83MIL CRM POLYISOPRENE

## (undated) DEVICE — GOWN,SIRUS,FABRNF,L,20/CS: Brand: MEDLINE

## (undated) DEVICE — GOWN,SIRUS,POLYRNF,BRTHSLV,XLN/XL,20/CS: Brand: MEDLINE

## (undated) DEVICE — MEDI-VAC YANKAUER SUCTION HANDLE W/BULBOUS TIP: Brand: CARDINAL HEALTH

## (undated) DEVICE — GLOVE SURG SZ 65 L12IN FNGR THK83MIL CRM POLYISOPRENE

## (undated) DEVICE — Device: Brand: PORTEX

## (undated) DEVICE — PLUMEPORT LAPAROSCOPIC SMOKE FILTRATION DEVICE: Brand: PLUMEPORT ACTIV

## (undated) DEVICE — TOWEL,OR,DSP,ST,BLUE,STD,6/PK,12PK/CS: Brand: MEDLINE

## (undated) DEVICE — TROCAR: Brand: KII FIOS FIRST ENTRY

## (undated) DEVICE — SYRINGE MED 10ML TRNSLUC BRL PLUNG BLK MRK POLYPR CTRL

## (undated) DEVICE — PENCIL ES L3M BTTN SWCH HOLSTER W/ BLDE ELECTRD EDGE

## (undated) DEVICE — AGENT HEMSTAT W2XL4IN OXIDIZED REGENERATED CELOS ABSRB

## (undated) DEVICE — GLOVE ORANGE PI 7 1/2   MSG9075